# Patient Record
Sex: MALE | Race: WHITE | NOT HISPANIC OR LATINO | RURAL
[De-identification: names, ages, dates, MRNs, and addresses within clinical notes are randomized per-mention and may not be internally consistent; named-entity substitution may affect disease eponyms.]

---

## 2022-12-31 ENCOUNTER — HOSPITAL ENCOUNTER (OUTPATIENT)
Facility: HOSPITAL | Age: 72
LOS: 1 days | Discharge: HOME | DRG: 682 | End: 2023-01-08
Attending: EMERGENCY MEDICINE | Admitting: FAMILY MEDICINE
Payer: COMMERCIAL

## 2022-12-31 DIAGNOSIS — E78.5 DYSLIPIDEMIA: ICD-10-CM

## 2022-12-31 DIAGNOSIS — R07.9 CHEST PAIN: ICD-10-CM

## 2022-12-31 DIAGNOSIS — E11.69 TYPE 2 DIABETES MELLITUS WITH OTHER SPECIFIED COMPLICATION, WITH LONG-TERM CURRENT USE OF INSULIN: ICD-10-CM

## 2022-12-31 DIAGNOSIS — I10 HYPERTENSION, UNSPECIFIED TYPE: ICD-10-CM

## 2022-12-31 DIAGNOSIS — R06.00 DYSPNEA: ICD-10-CM

## 2022-12-31 DIAGNOSIS — R06.00 DYSPNEA, UNSPECIFIED TYPE: Primary | ICD-10-CM

## 2022-12-31 DIAGNOSIS — N18.6 ESRD (END STAGE RENAL DISEASE): ICD-10-CM

## 2022-12-31 DIAGNOSIS — D64.9 ANEMIA, UNSPECIFIED TYPE: ICD-10-CM

## 2022-12-31 DIAGNOSIS — Z79.4 TYPE 2 DIABETES MELLITUS WITH OTHER SPECIFIED COMPLICATION, WITH LONG-TERM CURRENT USE OF INSULIN: ICD-10-CM

## 2022-12-31 LAB
ALBUMIN SERPL BCP-MCNC: 3.3 G/DL (ref 3.5–5)
ALBUMIN/GLOB SERPL: 0.9 {RATIO}
ALP SERPL-CCNC: 173 U/L (ref 45–115)
ALT SERPL W P-5'-P-CCNC: 41 U/L (ref 16–61)
ANION GAP SERPL CALCULATED.3IONS-SCNC: 17 MMOL/L (ref 7–16)
APTT PPP: 27.8 SECONDS (ref 25.2–37.3)
AST SERPL W P-5'-P-CCNC: 24 U/L (ref 15–37)
BACTERIA #/AREA URNS HPF: ABNORMAL /HPF
BASOPHILS # BLD AUTO: 0.07 K/UL (ref 0–0.2)
BASOPHILS NFR BLD AUTO: 0.7 % (ref 0–1)
BILIRUB SERPL-MCNC: 0.3 MG/DL (ref ?–1.2)
BILIRUB UR QL STRIP: NEGATIVE
BUN SERPL-MCNC: 48 MG/DL (ref 7–18)
BUN/CREAT SERPL: 7 (ref 6–20)
CALCIUM SERPL-MCNC: 8.7 MG/DL (ref 8.5–10.1)
CHLORIDE SERPL-SCNC: 99 MMOL/L (ref 98–107)
CLARITY UR: CLEAR
CO2 SERPL-SCNC: 26 MMOL/L (ref 21–32)
COLOR UR: COLORLESS
CREAT SERPL-MCNC: 6.61 MG/DL (ref 0.7–1.3)
DIFFERENTIAL METHOD BLD: ABNORMAL
EGFR (NO RACE VARIABLE) (RUSH/TITUS): 8 ML/MIN/1.73M²
EOSINOPHIL # BLD AUTO: 0.48 K/UL (ref 0–0.5)
EOSINOPHIL NFR BLD AUTO: 4.7 % (ref 1–4)
ERYTHROCYTE [DISTWIDTH] IN BLOOD BY AUTOMATED COUNT: 13.2 % (ref 11.5–14.5)
GLOBULIN SER-MCNC: 3.6 G/DL (ref 2–4)
GLUCOSE SERPL-MCNC: 389 MG/DL (ref 74–106)
GLUCOSE UR STRIP-MCNC: >1000 MG/DL
HCT VFR BLD AUTO: 31.1 % (ref 40–54)
HGB BLD-MCNC: 10.3 G/DL (ref 13.5–18)
IMM GRANULOCYTES # BLD AUTO: 0.03 K/UL (ref 0–0.04)
IMM GRANULOCYTES NFR BLD: 0.3 % (ref 0–0.4)
INR BLD: 0.94
KETONES UR STRIP-SCNC: NEGATIVE MG/DL
LEUKOCYTE ESTERASE UR QL STRIP: NEGATIVE
LYMPHOCYTES # BLD AUTO: 2.52 K/UL (ref 1–4.8)
LYMPHOCYTES NFR BLD AUTO: 24.5 % (ref 27–41)
MAGNESIUM SERPL-MCNC: 3.4 MG/DL (ref 1.7–2.3)
MCH RBC QN AUTO: 27.9 PG (ref 27–31)
MCHC RBC AUTO-ENTMCNC: 33.1 G/DL (ref 32–36)
MCV RBC AUTO: 84.3 FL (ref 80–96)
MONOCYTES # BLD AUTO: 0.66 K/UL (ref 0–0.8)
MONOCYTES NFR BLD AUTO: 6.4 % (ref 2–6)
MPC BLD CALC-MCNC: 11.8 FL (ref 9.4–12.4)
NEUTROPHILS # BLD AUTO: 6.53 K/UL (ref 1.8–7.7)
NEUTROPHILS NFR BLD AUTO: 63.4 % (ref 53–65)
NITRITE UR QL STRIP: NEGATIVE
NRBC # BLD AUTO: 0 X10E3/UL
NRBC, AUTO (.00): 0 %
NT-PROBNP SERPL-MCNC: ABNORMAL PG/ML (ref 1–125)
PH UR STRIP: 7.5 PH UNITS
PLATELET # BLD AUTO: 267 K/UL (ref 150–400)
POTASSIUM SERPL-SCNC: 4.7 MMOL/L (ref 3.5–5.1)
PROT SERPL-MCNC: 6.9 G/DL (ref 6.4–8.2)
PROT UR QL STRIP: 300
PROTHROMBIN TIME: 12.2 SECONDS (ref 11.7–14.7)
RBC # BLD AUTO: 3.69 M/UL (ref 4.6–6.2)
RBC # UR STRIP: ABNORMAL /UL
SARS-COV-2 RDRP RESP QL NAA+PROBE: NEGATIVE
SODIUM SERPL-SCNC: 137 MMOL/L (ref 136–145)
SP GR UR STRIP: 1.01
SQUAMOUS #/AREA URNS LPF: ABNORMAL /LPF
TROPONIN I SERPL HS-MCNC: 251.6 PG/ML
TROPONIN I SERPL HS-MCNC: 279.5 PG/ML
UROBILINOGEN UR STRIP-ACNC: NORMAL MG/DL
WBC # BLD AUTO: 10.29 K/UL (ref 4.5–11)
WBC #/AREA URNS HPF: ABNORMAL /HPF
YEAST #/AREA URNS HPF: ABNORMAL /HPF

## 2022-12-31 PROCEDURE — 83735 ASSAY OF MAGNESIUM: CPT | Performed by: EMERGENCY MEDICINE

## 2022-12-31 PROCEDURE — 93010 EKG 12-LEAD: ICD-10-PCS | Mod: ,,, | Performed by: INTERNAL MEDICINE

## 2022-12-31 PROCEDURE — 81003 URINALYSIS AUTO W/O SCOPE: CPT | Performed by: EMERGENCY MEDICINE

## 2022-12-31 PROCEDURE — 80053 COMPREHEN METABOLIC PANEL: CPT | Performed by: EMERGENCY MEDICINE

## 2022-12-31 PROCEDURE — 85730 THROMBOPLASTIN TIME PARTIAL: CPT | Performed by: EMERGENCY MEDICINE

## 2022-12-31 PROCEDURE — 93005 ELECTROCARDIOGRAM TRACING: CPT

## 2022-12-31 PROCEDURE — 87635 SARS-COV-2 COVID-19 AMP PRB: CPT | Performed by: FAMILY MEDICINE

## 2022-12-31 PROCEDURE — 99223 1ST HOSP IP/OBS HIGH 75: CPT | Mod: ,,, | Performed by: FAMILY MEDICINE

## 2022-12-31 PROCEDURE — 93010 ELECTROCARDIOGRAM REPORT: CPT | Mod: ,,, | Performed by: INTERNAL MEDICINE

## 2022-12-31 PROCEDURE — 99285 EMERGENCY DEPT VISIT HI MDM: CPT | Mod: 25

## 2022-12-31 PROCEDURE — 99223 PR INITIAL HOSPITAL CARE,LEVL III: ICD-10-PCS | Mod: ,,, | Performed by: FAMILY MEDICINE

## 2022-12-31 PROCEDURE — 85025 COMPLETE CBC W/AUTO DIFF WBC: CPT | Performed by: EMERGENCY MEDICINE

## 2022-12-31 PROCEDURE — 85610 PROTHROMBIN TIME: CPT | Performed by: EMERGENCY MEDICINE

## 2022-12-31 PROCEDURE — 83880 ASSAY OF NATRIURETIC PEPTIDE: CPT | Performed by: EMERGENCY MEDICINE

## 2022-12-31 PROCEDURE — 84484 ASSAY OF TROPONIN QUANT: CPT | Performed by: FAMILY MEDICINE

## 2022-12-31 PROCEDURE — 63600175 PHARM REV CODE 636 W HCPCS: Performed by: FAMILY MEDICINE

## 2022-12-31 PROCEDURE — 99284 EMERGENCY DEPT VISIT MOD MDM: CPT | Mod: ,,, | Performed by: EMERGENCY MEDICINE

## 2022-12-31 PROCEDURE — 84484 ASSAY OF TROPONIN QUANT: CPT | Performed by: EMERGENCY MEDICINE

## 2022-12-31 PROCEDURE — 36415 COLL VENOUS BLD VENIPUNCTURE: CPT | Performed by: EMERGENCY MEDICINE

## 2022-12-31 PROCEDURE — 11000001 HC ACUTE MED/SURG PRIVATE ROOM

## 2022-12-31 PROCEDURE — 25000003 PHARM REV CODE 250: Performed by: FAMILY MEDICINE

## 2022-12-31 PROCEDURE — 99284 PR EMERGENCY DEPT VISIT,LEVEL IV: ICD-10-PCS | Mod: ,,, | Performed by: EMERGENCY MEDICINE

## 2022-12-31 RX ORDER — ATORVASTATIN CALCIUM 80 MG/1
80 TABLET, FILM COATED ORAL DAILY
Status: DISCONTINUED | OUTPATIENT
Start: 2023-01-01 | End: 2023-01-08 | Stop reason: HOSPADM

## 2022-12-31 RX ORDER — INSULIN ASPART 100 [IU]/ML
0-5 INJECTION, SOLUTION INTRAVENOUS; SUBCUTANEOUS
Status: DISCONTINUED | OUTPATIENT
Start: 2022-12-31 | End: 2023-01-08 | Stop reason: HOSPADM

## 2022-12-31 RX ORDER — NAPROXEN SODIUM 220 MG/1
81 TABLET, FILM COATED ORAL DAILY
Status: DISCONTINUED | OUTPATIENT
Start: 2023-01-01 | End: 2023-01-08 | Stop reason: HOSPADM

## 2022-12-31 RX ORDER — CLOPIDOGREL BISULFATE 75 MG/1
75 TABLET ORAL DAILY
Status: DISCONTINUED | OUTPATIENT
Start: 2023-01-01 | End: 2023-01-08 | Stop reason: HOSPADM

## 2022-12-31 RX ORDER — AMLODIPINE BESYLATE 10 MG/1
10 TABLET ORAL DAILY
Status: DISCONTINUED | OUTPATIENT
Start: 2023-01-01 | End: 2023-01-08 | Stop reason: HOSPADM

## 2022-12-31 RX ORDER — NALOXONE HCL 0.4 MG/ML
0.02 VIAL (ML) INJECTION
Status: DISCONTINUED | OUTPATIENT
Start: 2022-12-31 | End: 2023-01-08 | Stop reason: HOSPADM

## 2022-12-31 RX ORDER — HEPARIN SODIUM 5000 [USP'U]/ML
5000 INJECTION, SOLUTION INTRAVENOUS; SUBCUTANEOUS EVERY 8 HOURS
Status: DISCONTINUED | OUTPATIENT
Start: 2022-12-31 | End: 2023-01-08 | Stop reason: HOSPADM

## 2022-12-31 RX ORDER — CARVEDILOL 12.5 MG/1
12.5 TABLET ORAL 2 TIMES DAILY
Status: DISCONTINUED | OUTPATIENT
Start: 2022-12-31 | End: 2023-01-08

## 2022-12-31 RX ORDER — SODIUM CHLORIDE 0.9 % (FLUSH) 0.9 %
10 SYRINGE (ML) INJECTION EVERY 12 HOURS PRN
Status: DISCONTINUED | OUTPATIENT
Start: 2022-12-31 | End: 2023-01-08 | Stop reason: HOSPADM

## 2022-12-31 RX ORDER — HYDRALAZINE HYDROCHLORIDE 20 MG/ML
20 INJECTION INTRAMUSCULAR; INTRAVENOUS
Status: DISCONTINUED | OUTPATIENT
Start: 2022-12-31 | End: 2022-12-31

## 2022-12-31 RX ORDER — FUROSEMIDE 10 MG/ML
40 INJECTION INTRAMUSCULAR; INTRAVENOUS
Status: DISCONTINUED | OUTPATIENT
Start: 2022-12-31 | End: 2023-01-08

## 2022-12-31 RX ORDER — GLUCAGON 1 MG
1 KIT INJECTION
Status: DISCONTINUED | OUTPATIENT
Start: 2022-12-31 | End: 2023-01-08 | Stop reason: HOSPADM

## 2022-12-31 RX ADMIN — HEPARIN SODIUM 5000 UNITS: 5000 INJECTION, SOLUTION INTRAVENOUS; SUBCUTANEOUS at 10:12

## 2022-12-31 RX ADMIN — NITROGLYCERIN 0.5 INCH: 20 OINTMENT TOPICAL at 07:12

## 2022-12-31 RX ADMIN — SODIUM ZIRCONIUM CYCLOSILICATE 5 G: 5 POWDER, FOR SUSPENSION ORAL at 07:12

## 2022-12-31 RX ADMIN — CARVEDILOL 12.5 MG: 12.5 TABLET, FILM COATED ORAL at 10:12

## 2022-12-31 RX ADMIN — FUROSEMIDE 40 MG: 10 INJECTION, SOLUTION INTRAMUSCULAR; INTRAVENOUS at 07:12

## 2022-12-31 NOTE — SUBJECTIVE & OBJECTIVE
Past Medical History:   Diagnosis Date    Diabetes mellitus     Hypertension     Renal failure     Stroke        History reviewed. No pertinent surgical history.    Review of patient's allergies indicates:  No Known Allergies    No current facility-administered medications on file prior to encounter.     No current outpatient medications on file prior to encounter.     Family History    None       Tobacco Use    Smoking status: Never    Smokeless tobacco: Never   Substance and Sexual Activity    Alcohol use: Yes     Alcohol/week: 1.0 standard drink     Types: 1 Cans of beer per week    Drug use: Never    Sexual activity: Not on file     Review of Systems   Constitutional:  Negative for activity change, chills and fever.   HENT:  Negative for sinus pressure and sinus pain.    Respiratory:  Positive for shortness of breath. Negative for cough and wheezing.    Cardiovascular:  Negative for chest pain and palpitations.   Gastrointestinal:  Negative for abdominal pain, nausea and vomiting.   Genitourinary:  Negative for dysuria and hematuria.   Musculoskeletal:  Negative for arthralgias and joint swelling.   Skin:  Negative for color change and rash.   Objective:     Vital Signs (Most Recent):  Temp: 97.4 °F (36.3 °C) (12/31/22 1300)  Pulse: 68 (12/31/22 1544)  Resp: 17 (12/31/22 1544)  BP: (!) 176/75 (12/31/22 1544)  SpO2: 99 % (12/31/22 1544)   Vital Signs (24h Range):  Temp:  [97.4 °F (36.3 °C)] 97.4 °F (36.3 °C)  Pulse:  [59-69] 68  Resp:  [10-17] 17  SpO2:  [96 %-99 %] 99 %  BP: (169-213)/(67-85) 176/75     Weight: 90.7 kg (200 lb)  Body mass index is 27.12 kg/m².    Physical Exam  Vitals reviewed.   Constitutional:       General: He is not in acute distress.     Appearance: He is not toxic-appearing.   HENT:      Head: Normocephalic and atraumatic.      Right Ear: External ear normal.      Left Ear: External ear normal.      Nose:      Comments: Post op changes nose. Hx of cancer surgery and reconstruction.    Cardiovascular:      Rate and Rhythm: Normal rate and regular rhythm.      Heart sounds: Normal heart sounds.   Pulmonary:      Effort: Pulmonary effort is normal. No respiratory distress.      Breath sounds: Normal breath sounds. No wheezing.   Abdominal:      General: Abdomen is flat. Bowel sounds are normal. There is no distension.      Palpations: Abdomen is soft.      Tenderness: There is no abdominal tenderness.   Musculoskeletal:      Right lower leg: Edema present.      Left lower leg: Edema present.   Skin:     General: Skin is warm and dry.      Coloration: Skin is not jaundiced.      Findings: No bruising.   Neurological:      General: No focal deficit present.      Mental Status: He is alert and oriented to person, place, and time.      Comments: Subtle, mild confusion.           Significant Labs: All pertinent labs within the past 24 hours have been reviewed.  Recent Lab Results         12/31/22  1502   12/31/22  1313        Albumin/Globulin Ratio   0.9       Albumin   3.3       Alkaline Phosphatase   173       ALT   41       Anion Gap   17       Appearance, UA Clear         aPTT   27.8       AST   24       Bacteria, UA Few         Baso #   0.07       Basophil %   0.7       Bilirubin (UA) Negative         BILIRUBIN TOTAL   0.3       BUN   48       BUN/CREAT RATIO   7       Calcium   8.7       Chloride   99       CO2   26       Color, UA Colorless         Creatinine   6.61       Differential Type   Auto       eGFR   8       Eos #   0.48       Eosinophil %   4.7       Globulin, Total   3.6       Glucose   389       Glucose, UA >1000         Hematocrit   31.1       Hemoglobin   10.3       Immature Grans (Abs)   0.03       Immature Granulocytes   0.3       INR   0.94       Ketones, UA Negative         Leukocytes, UA Negative         Lymph #   2.52       Lymph %   24.5       Magnesium   3.4       MCH   27.9       MCHC   33.1       MCV   84.3       Mono #   0.66       Mono %   6.4       MPV   11.8        Neutrophils, Abs   6.53       Neutrophils Relative   63.4       NITRITE UA Negative         nRBC   0.0       NT-proBNP   10,870       NUCLEATED RBC ABSOLUTE   0.00       Occult Blood UA Trace         pH, UA 7.5         Platelets   267       Potassium   4.7       PROTEIN TOTAL   6.9       Protein,          Protime   12.2       RBC   3.69       RDW   13.2       Sodium   137       Specific Gravity, UA 1.009         Squam Epithel, UA Few         Troponin I High Sensitivity   279.5       UROBILINOGEN UA Normal         WBC, UA 0-5         WBC   10.29       Yeast, UA Few                 Significant Imaging: I have reviewed all pertinent imaging results/findings within the past 24 hours. CXR - no pulmonary edema

## 2022-12-31 NOTE — HPI
71 y/o WM with ESRD on HD for about 1 year.  Nephrologist Sven Bernal.  Has missed last 2 HD sessions as he has been dismissed as they found a firearm in his pocket.  Patient states he forgot to leave it in his car.  Regardless patient with some mild SOB today. Currently feels fine and would like to go home but has no where to get HD.   No CP but troponin elevated. We are asked to admit to arrange HD.

## 2022-12-31 NOTE — ED PROVIDER NOTES
Encounter Date: 12/31/2022    SCRIBE #1 NOTE: I, Nini Jacob, am scribing for, and in the presence of,  Elias Bernal MD. I have scribed the entire note.     History     Chief Complaint   Patient presents with    Shortness of Breath     72 y.o. male presents to the ED with complaints of shortness of breath. Patient has been on dialysis for about a year. Patient stated he was supposed to dialyze Thursday and today. Patient was kicked out of facility because he brought a gun in with him.     The history is provided by the patient and a relative. No  was used.   Review of patient's allergies indicates:  No Known Allergies  Past Medical History:   Diagnosis Date    Diabetes mellitus     Hypertension     Renal failure     Stroke      History reviewed. No pertinent surgical history.  History reviewed. No pertinent family history.  Social History     Tobacco Use    Smoking status: Never    Smokeless tobacco: Never   Substance Use Topics    Alcohol use: Yes     Alcohol/week: 1.0 standard drink     Types: 1 Cans of beer per week    Drug use: Never     Review of Systems   Constitutional: Negative.    HENT: Negative.     Eyes: Negative.    Respiratory:  Positive for shortness of breath.    Cardiovascular: Negative.    Gastrointestinal: Negative.    Endocrine: Negative.    Genitourinary: Negative.    Musculoskeletal: Negative.    Skin: Negative.    Allergic/Immunologic: Negative.    Neurological: Negative.    Hematological: Negative.    Psychiatric/Behavioral: Negative.     All other systems reviewed and are negative.    Physical Exam     Initial Vitals [12/31/22 1300]   BP Pulse Resp Temp SpO2   (!) 213/85 69 13 97.4 °F (36.3 °C) 98 %      MAP       --         Physical Exam    Nursing note and vitals reviewed.  Constitutional: He appears well-developed and well-nourished.   Neck:   Normal range of motion.  Cardiovascular:  Normal rate, regular rhythm and normal heart sounds.            Pulmonary/Chest: Breath sounds normal.   Abdominal: Abdomen is soft.   Musculoskeletal:         General: Normal range of motion.      Cervical back: Normal range of motion.      Right lower leg: Pitting Edema present.      Left lower leg: Pitting Edema present.     Neurological: He is alert and oriented to person, place, and time.   Skin: Skin is warm.       ED Course   Procedures  Labs Reviewed   COMPREHENSIVE METABOLIC PANEL - Abnormal; Notable for the following components:       Result Value    Anion Gap 17 (*)     Glucose 389 (*)     BUN 48 (*)     Creatinine 6.61 (*)     Albumin 3.3 (*)     Alk Phos 173 (*)     eGFR 8 (*)     All other components within normal limits   NT-PRO NATRIURETIC PEPTIDE - Abnormal; Notable for the following components:    ProBNP 10,870 (*)     All other components within normal limits   MAGNESIUM - Abnormal; Notable for the following components:    Magnesium 3.4 (*)     All other components within normal limits   TROPONIN I - Abnormal; Notable for the following components:    Troponin I High Sensitivity 279.5 (*)     All other components within normal limits   URINALYSIS, REFLEX TO URINE CULTURE - Abnormal; Notable for the following components:    Protein,  (*)     Blood, UA Trace (*)     All other components within normal limits   CBC WITH DIFFERENTIAL - Abnormal; Notable for the following components:    RBC 3.69 (*)     Hemoglobin 10.3 (*)     Hematocrit 31.1 (*)     Lymphocytes % 24.5 (*)     Monocytes % 6.4 (*)     Eosinophils % 4.7 (*)     All other components within normal limits   URINALYSIS, MICROSCOPIC - Abnormal; Notable for the following components:    Bacteria, UA Few (*)     Yeast, UA Few (*)     Squamous Epithelial Cells, UA Few (*)     All other components within normal limits   APTT - Normal   PROTIME-INR - Normal   CBC W/ AUTO DIFFERENTIAL    Narrative:     The following orders were created for panel order CBC auto differential.  Procedure                                Abnormality         Status                     ---------                               -----------         ------                     CBC with Differential[[id number]]        Abnormal            Final result                 Please view results for these tests on the individual orders.          Imaging Results              X-Ray Chest AP Portable (Final result)  Result time 12/31/22 13:13:41      Final result by Pancho Gonzalez MD (12/31/22 13:13:41)                   Impression:      No acute findings.      Electronically signed by: Pancho Gonzalez  Date:    12/31/2022  Time:    13:13               Narrative:    EXAMINATION:  XR CHEST AP PORTABLE    CLINICAL HISTORY:  Dyspnea, unspecified    TECHNIQUE:  Single frontal view of the chest was performed.    COMPARISON:  None    FINDINGS:  Cardiac silhouette mildly enlarged.  Lungs clear.  No pneumothorax.  No large pleural effusion.                                       Medications - No data to display             Attending Attestation:           Physician Attestation for Scribe:  Physician Attestation Statement for Scribe #1: I, Elias Bernal MD, reviewed documentation, as scribed by Nini Jacob in my presence, and it is both accurate and complete.                        Clinical Impression:   Final diagnoses:  [R06.00] Dyspnea  [R06.00] Dyspnea, unspecified type (Primary)  [N18.6] ESRD (end stage renal disease)        ED Disposition Condition    Observation Stable                Elias Bernal MD  12/31/22 6980

## 2022-12-31 NOTE — ED TRIAGE NOTES
Pt presents to ED with SOB; states he has not had dialysis since Tuesday. Missed Thursday HDY and today; HDY facility will not let him come back since he forgot to take a pistol out of his pocket.

## 2022-12-31 NOTE — H&P
[facility] - Emergency Department  Hospital Medicine  History & Physical    Patient Name: Leanna Harrison  MRN: [id number]  Patient Class: IP- Inpatient  Admission Date: 12/31/2022  Attending Physician: Eleazar Singh Jr., MD   Primary Care Provider: Primary Doctor No         Patient information was obtained from patient, relative(s) and ER records.     Subjective:     Principal Problem:ESRD (end stage renal disease)    Chief Complaint:   Chief Complaint   Patient presents with    Shortness of Breath        HPI: 73 y/o WM with ESRD on HD for about 1 year.  Nephrologist Sven Bernal.  Has missed last 2 HD sessions as he has been dismissed as they found a firearm in his pocket.  Patient states he forgot to leave it in his car.  Regardless patient with some mild SOB today. Currently feels fine and would like to go home but has no where to get HD.   No CP but troponin elevated. We are asked to admit to arrange HD.       Past Medical History:   Diagnosis Date    Diabetes mellitus     Hypertension     Renal failure     Stroke        History reviewed. No pertinent surgical history.    Review of patient's allergies indicates:  No Known Allergies    No current facility-administered medications on file prior to encounter.     No current outpatient medications on file prior to encounter.     Family History    None       Tobacco Use    Smoking status: Never    Smokeless tobacco: Never   Substance and Sexual Activity    Alcohol use: Yes     Alcohol/week: 1.0 standard drink     Types: 1 Cans of beer per week    Drug use: Never    Sexual activity: Not on file     Review of Systems   Constitutional:  Negative for activity change, chills and fever.   HENT:  Negative for sinus pressure and sinus pain.    Respiratory:  Positive for shortness of breath. Negative for cough and wheezing.    Cardiovascular:  Negative for chest pain and palpitations.   Gastrointestinal:  Negative for abdominal pain, nausea and vomiting.    Genitourinary:  Negative for dysuria and hematuria.   Musculoskeletal:  Negative for arthralgias and joint swelling.   Skin:  Negative for color change and rash.   Objective:     Vital Signs (Most Recent):  Temp: 97.4 °F (36.3 °C) (12/31/22 1300)  Pulse: 68 (12/31/22 1544)  Resp: 17 (12/31/22 1544)  BP: (!) 176/75 (12/31/22 1544)  SpO2: 99 % (12/31/22 1544)   Vital Signs (24h Range):  Temp:  [97.4 °F (36.3 °C)] 97.4 °F (36.3 °C)  Pulse:  [59-69] 68  Resp:  [10-17] 17  SpO2:  [96 %-99 %] 99 %  BP: (169-213)/(67-85) 176/75     Weight: 90.7 kg (200 lb)  Body mass index is 27.12 kg/m².    Physical Exam  Vitals reviewed.   Constitutional:       General: He is not in acute distress.     Appearance: He is not toxic-appearing.   HENT:      Head: Normocephalic and atraumatic.      Right Ear: External ear normal.      Left Ear: External ear normal.      Nose:      Comments: Post op changes nose. Hx of cancer surgery and reconstruction.   Cardiovascular:      Rate and Rhythm: Normal rate and regular rhythm.      Heart sounds: Normal heart sounds.   Pulmonary:      Effort: Pulmonary effort is normal. No respiratory distress.      Breath sounds: Normal breath sounds. No wheezing.   Abdominal:      General: Abdomen is flat. Bowel sounds are normal. There is no distension.      Palpations: Abdomen is soft.      Tenderness: There is no abdominal tenderness.   Musculoskeletal:      Right lower leg: Edema present.      Left lower leg: Edema present.   Skin:     General: Skin is warm and dry.      Coloration: Skin is not jaundiced.      Findings: No bruising.   Neurological:      General: No focal deficit present.      Mental Status: He is alert and oriented to person, place, and time.      Comments: Subtle, mild confusion.           Significant Labs: All pertinent labs within the past 24 hours have been reviewed.  Recent Lab Results         12/31/22  1502   12/31/22  1313        Albumin/Globulin Ratio   0.9       Albumin   3.3        Alkaline Phosphatase   173       ALT   41       Anion Gap   17       Appearance, UA Clear         aPTT   27.8       AST   24       Bacteria, UA Few         Baso #   0.07       Basophil %   0.7       Bilirubin (UA) Negative         BILIRUBIN TOTAL   0.3       BUN   48       BUN/CREAT RATIO   7       Calcium   8.7       Chloride   99       CO2   26       Color, UA Colorless         Creatinine   6.61       Differential Type   Auto       eGFR   8       Eos #   0.48       Eosinophil %   4.7       Globulin, Total   3.6       Glucose   389       Glucose, UA >1000         Hematocrit   31.1       Hemoglobin   10.3       Immature Grans (Abs)   0.03       Immature Granulocytes   0.3       INR   0.94       Ketones, UA Negative         Leukocytes, UA Negative         Lymph #   2.52       Lymph %   24.5       Magnesium   3.4       MCH   27.9       MCHC   33.1       MCV   84.3       Mono #   0.66       Mono %   6.4       MPV   11.8       Neutrophils, Abs   6.53       Neutrophils Relative   63.4       NITRITE UA Negative         nRBC   0.0       NT-proBNP   10,870       NUCLEATED RBC ABSOLUTE   0.00       Occult Blood UA Trace         pH, UA 7.5         Platelets   267       Potassium   4.7       PROTEIN TOTAL   6.9       Protein,          Protime   12.2       RBC   3.69       RDW   13.2       Sodium   137       Specific Gravity, UA 1.009         Squam Epithel, UA Few         Troponin I High Sensitivity   279.5       UROBILINOGEN UA Normal         WBC, UA 0-5         WBC   10.29       Yeast, UA Few                 Significant Imaging: I have reviewed all pertinent imaging results/findings within the past 24 hours. CXR - no pulmonary edema    Assessment/Plan:     * ESRD (end stage renal disease)    Currently without an outpatient chair. Has missed 2 sessions, Bun/cr only 48/6.6.  Will have to work on this after the holiday.  No indication for urgent HD tonight. Will use nitropaste to relieve any pulmonary congestion.  Give  a dose of lokelma,  K 4.7. Still producing some urine will give lasix 40 bid and place on fluid restriction. Consult nephrology tomorrow.     Dyspnea    Likely related to fluid accumulation. See plan for ESRD.  Troponin elevated x 1. Will recheck. No Chest pain.     VTE Risk Mitigation (From admission, onward)         Ordered     heparin (porcine) injection 5,000 Units  Every 8 hours         12/31/22 1737     IP VTE HIGH RISK PATIENT  Once         12/31/22 1737     Place sequential compression device  Until discontinued         12/31/22 1737                   Eleazar Singh Jr, MD  Department of Hospital Medicine   [facility] - Emergency Department

## 2022-12-31 NOTE — ASSESSMENT & PLAN NOTE
Likely related to fluid accumulation. See plan for ESRD.  Troponin elevated x 1. Will recheck. No Chest pain.

## 2022-12-31 NOTE — ASSESSMENT & PLAN NOTE
Currently without an outpatient chair. Has missed 2 sessions, Bun/cr only 48/6.6.  Will have to work on this after the holiday.  No indication for urgent HD tonight. Will use nitropaste to relieve any pulmonary congestion.  Give a dose of lokelma,  K 4.7. Still producing some urine will give lasix 40 bid and place on fluid restriction. Consult nephrology tomorrow.

## 2023-01-01 PROBLEM — I10 HTN (HYPERTENSION): Status: ACTIVE | Noted: 2023-01-01

## 2023-01-01 PROBLEM — D64.9 ANEMIA: Status: ACTIVE | Noted: 2023-01-01

## 2023-01-01 PROBLEM — E78.5 DYSLIPIDEMIA: Status: ACTIVE | Noted: 2023-01-01

## 2023-01-01 PROBLEM — E11.9 DIABETES MELLITUS: Status: ACTIVE | Noted: 2023-01-01

## 2023-01-01 LAB
ALBUMIN SERPL BCP-MCNC: 2.8 G/DL (ref 3.5–5)
ANION GAP SERPL CALCULATED.3IONS-SCNC: 15 MMOL/L (ref 7–16)
BASOPHILS # BLD AUTO: 0.07 K/UL (ref 0–0.2)
BASOPHILS NFR BLD AUTO: 0.7 % (ref 0–1)
BUN SERPL-MCNC: 51 MG/DL (ref 7–18)
BUN/CREAT SERPL: 7 (ref 6–20)
CALCIUM SERPL-MCNC: 8.5 MG/DL (ref 8.5–10.1)
CHLORIDE SERPL-SCNC: 105 MMOL/L (ref 98–107)
CO2 SERPL-SCNC: 26 MMOL/L (ref 21–32)
CREAT SERPL-MCNC: 6.88 MG/DL (ref 0.7–1.3)
DIFFERENTIAL METHOD BLD: ABNORMAL
EGFR (NO RACE VARIABLE) (RUSH/TITUS): 8 ML/MIN/1.73M²
EOSINOPHIL # BLD AUTO: 0.44 K/UL (ref 0–0.5)
EOSINOPHIL NFR BLD AUTO: 4.6 % (ref 1–4)
ERYTHROCYTE [DISTWIDTH] IN BLOOD BY AUTOMATED COUNT: 13.2 % (ref 11.5–14.5)
GLUCOSE SERPL-MCNC: 187 MG/DL (ref 70–105)
GLUCOSE SERPL-MCNC: 217 MG/DL (ref 70–105)
GLUCOSE SERPL-MCNC: 223 MG/DL (ref 74–106)
GLUCOSE SERPL-MCNC: 231 MG/DL (ref 70–105)
GLUCOSE SERPL-MCNC: 277 MG/DL (ref 70–105)
GLUCOSE SERPL-MCNC: 368 MG/DL (ref 70–105)
HCT VFR BLD AUTO: 28.7 % (ref 40–54)
HGB BLD-MCNC: 9.3 G/DL (ref 13.5–18)
IMM GRANULOCYTES # BLD AUTO: 0.02 K/UL (ref 0–0.04)
IMM GRANULOCYTES NFR BLD: 0.2 % (ref 0–0.4)
LYMPHOCYTES # BLD AUTO: 2.11 K/UL (ref 1–4.8)
LYMPHOCYTES NFR BLD AUTO: 21.9 % (ref 27–41)
MCH RBC QN AUTO: 27.8 PG (ref 27–31)
MCHC RBC AUTO-ENTMCNC: 32.4 G/DL (ref 32–36)
MCV RBC AUTO: 85.7 FL (ref 80–96)
MONOCYTES # BLD AUTO: 0.7 K/UL (ref 0–0.8)
MONOCYTES NFR BLD AUTO: 7.3 % (ref 2–6)
MPC BLD CALC-MCNC: 11.5 FL (ref 9.4–12.4)
NEUTROPHILS # BLD AUTO: 6.3 K/UL (ref 1.8–7.7)
NEUTROPHILS NFR BLD AUTO: 65.3 % (ref 53–65)
NRBC # BLD AUTO: 0 X10E3/UL
NRBC, AUTO (.00): 0 %
PHOSPHATE SERPL-MCNC: 5.2 MG/DL (ref 2.5–4.5)
PLATELET # BLD AUTO: 247 K/UL (ref 150–400)
POTASSIUM SERPL-SCNC: 4.2 MMOL/L (ref 3.5–5.1)
RBC # BLD AUTO: 3.35 M/UL (ref 4.6–6.2)
SODIUM SERPL-SCNC: 142 MMOL/L (ref 136–145)
WBC # BLD AUTO: 9.64 K/UL (ref 4.5–11)

## 2023-01-01 PROCEDURE — 80069 RENAL FUNCTION PANEL: CPT | Performed by: FAMILY MEDICINE

## 2023-01-01 PROCEDURE — 96372 THER/PROPH/DIAG INJ SC/IM: CPT | Mod: 59 | Performed by: FAMILY MEDICINE

## 2023-01-01 PROCEDURE — 25000003 PHARM REV CODE 250: Performed by: FAMILY MEDICINE

## 2023-01-01 PROCEDURE — 63600175 PHARM REV CODE 636 W HCPCS: Performed by: FAMILY MEDICINE

## 2023-01-01 PROCEDURE — 63600175 PHARM REV CODE 636 W HCPCS: Performed by: INTERNAL MEDICINE

## 2023-01-01 PROCEDURE — 11000001 HC ACUTE MED/SURG PRIVATE ROOM

## 2023-01-01 PROCEDURE — 99232 SBSQ HOSP IP/OBS MODERATE 35: CPT | Mod: ,,, | Performed by: FAMILY MEDICINE

## 2023-01-01 PROCEDURE — 36415 COLL VENOUS BLD VENIPUNCTURE: CPT | Performed by: FAMILY MEDICINE

## 2023-01-01 PROCEDURE — 99232 PR SUBSEQUENT HOSPITAL CARE,LEVL II: ICD-10-PCS | Mod: ,,, | Performed by: FAMILY MEDICINE

## 2023-01-01 PROCEDURE — 85025 COMPLETE CBC W/AUTO DIFF WBC: CPT | Performed by: FAMILY MEDICINE

## 2023-01-01 PROCEDURE — 96374 THER/PROPH/DIAG INJ IV PUSH: CPT

## 2023-01-01 PROCEDURE — 82962 GLUCOSE BLOOD TEST: CPT

## 2023-01-01 PROCEDURE — G0378 HOSPITAL OBSERVATION PER HR: HCPCS

## 2023-01-01 RX ADMIN — NITROGLYCERIN 0.5 INCH: 20 OINTMENT TOPICAL at 06:01

## 2023-01-01 RX ADMIN — CARVEDILOL 12.5 MG: 12.5 TABLET, FILM COATED ORAL at 08:01

## 2023-01-01 RX ADMIN — ASPIRIN 81 MG CHEWABLE TABLET 81 MG: 81 TABLET CHEWABLE at 08:01

## 2023-01-01 RX ADMIN — FUROSEMIDE 40 MG: 10 INJECTION, SOLUTION INTRAMUSCULAR; INTRAVENOUS at 06:01

## 2023-01-01 RX ADMIN — CLOPIDOGREL BISULFATE 75 MG: 75 TABLET, FILM COATED ORAL at 08:01

## 2023-01-01 RX ADMIN — NITROGLYCERIN 0.5 INCH: 20 OINTMENT TOPICAL at 03:01

## 2023-01-01 RX ADMIN — INSULIN ASPART 3 UNITS: 100 INJECTION, SOLUTION INTRAVENOUS; SUBCUTANEOUS at 05:01

## 2023-01-01 RX ADMIN — AMLODIPINE BESYLATE 10 MG: 10 TABLET ORAL at 08:01

## 2023-01-01 RX ADMIN — NITROGLYCERIN 0.5 INCH: 20 OINTMENT TOPICAL at 05:01

## 2023-01-01 RX ADMIN — HEPARIN SODIUM 5000 UNITS: 5000 INJECTION, SOLUTION INTRAVENOUS; SUBCUTANEOUS at 03:01

## 2023-01-01 RX ADMIN — NITROGLYCERIN 0.5 INCH: 20 OINTMENT TOPICAL at 12:01

## 2023-01-01 RX ADMIN — ATORVASTATIN CALCIUM 80 MG: 80 TABLET, FILM COATED ORAL at 08:01

## 2023-01-01 RX ADMIN — EPOETIN ALFA-EPBX 10000 UNITS: 10000 INJECTION, SOLUTION INTRAVENOUS; SUBCUTANEOUS at 03:01

## 2023-01-01 RX ADMIN — HEPARIN SODIUM 5000 UNITS: 5000 INJECTION, SOLUTION INTRAVENOUS; SUBCUTANEOUS at 06:01

## 2023-01-01 NOTE — CONSULTS
[facility] - Emergency Department  Nephrology  Consult Note    Patient Name: Leanna Harrison  MRN: [id number]  Admission Date: 12/31/2022  Hospital Length of Stay: 1 days  Attending Provider: Eleazar Singh Jr., MD   Primary Care Physician: Primary Doctor No  Principal Problem:ESRD (end stage renal disease)    Consults  Subjective:     HPI: Chief complaint is ESRD    History of present illness Mr. Almonte is a 72-year-old white gentleman with end-stage renal disease who had been dialyzing on a regular basis in [facility].  Apparently the patient last dialyzed this past Tuesday.  The history is taken from the patient the chart and medical personnel.  The patient apparently was fired from his regular dialysis unit for bring a gun on the premises.  The patient came to the emergency room at the urging of his family.  The patient was visiting his younger brother in the hospital here when he became short of breath.  The patient thinks his shortness of breath may have been precipitated by some indigestion after eating a cinnamon bark on yesterday morning while in the hospital.  He was given couple of doses of Lasix here in the hospital and his breathing is improved greatly.  The patient has over a L of urine in his urinal.  The patient is sitting upright he denies any shortness of breath at this time.  He states his shortness of breath was severe.  It was constant in nature until getting treatment in the emergency room.  The patient states he had a cough about a week or so ago but that improved on its own.  He complains of having some associated chest pain with his shortness of breath.      Past Medical History:   Diagnosis Date    Diabetes mellitus     Hypertension     Renal failure     Stroke        History reviewed. No pertinent surgical history.    Review of patient's allergies indicates:  No Known Allergies  Current Facility-Administered Medications   Medication Frequency    amLODIPine tablet 10 mg  Daily    aspirin chewable tablet 81 mg Daily    atorvastatin tablet 80 mg Daily    carvediloL tablet 12.5 mg BID    clopidogreL tablet 75 mg Daily    dextrose 10% bolus 125 mL 125 mL PRN    dextrose 10% bolus 250 mL 250 mL PRN    furosemide injection 40 mg Q12H    glucagon (human recombinant) injection 1 mg PRN    heparin (porcine) injection 5,000 Units Q8H    insulin aspart U-100 injection 0-5 Units QID (AC + HS) PRN    naloxone 0.4 mg/mL injection 0.02 mg PRN    nitroGLYCERIN 2% TD oint ointment 0.5 inch Q6H    sodium chloride 0.9% flush 10 mL Q12H PRN     No current outpatient medications on file.     Family History    None       Tobacco Use    Smoking status: Never    Smokeless tobacco: Never   Substance and Sexual Activity    Alcohol use: Yes     Alcohol/week: 1.0 standard drink     Types: 1 Cans of beer per week    Drug use: Never    Sexual activity: Not on file     Review of Systems   Constitutional:  Negative for fever.   HENT:  Negative for sore throat.    Respiratory:  Positive for shortness of breath.    Cardiovascular:  Positive for chest pain.   Gastrointestinal:  Negative for vomiting.   Genitourinary:  Negative for dysuria.   Musculoskeletal:  Positive for arthralgias.   Skin:  Positive for rash.   Neurological:  Negative for seizures.   Hematological:  Negative for adenopathy.   Objective:     Vital Signs (Most Recent):  Temp: 97.4 °F (36.3 °C) (12/31/22 1300)  Pulse: 61 (01/01/23 0650)  Resp: 18 (01/01/23 0650)  BP: (!) 179/73 (01/01/23 0650)  SpO2: 95 % (01/01/23 0650)   Vital Signs (24h Range):  Temp:  [97.4 °F (36.3 °C)] 97.4 °F (36.3 °C)  Pulse:  [59-69] 61  Resp:  [10-18] 18  SpO2:  [94 %-99 %] 95 %  BP: (169-213)/(67-85) 179/73     Weight: 90.7 kg (200 lb) (12/31/22 1300)  Body mass index is 27.12 kg/m².  Body surface area is 2.15 meters squared.    No intake/output data recorded.    Physical Exam  Vitals reviewed.   Constitutional:       General: He is not in acute  distress.     Appearance: He is not toxic-appearing.   HENT:      Head: Normocephalic and atraumatic.      Nose: Nose normal.      Mouth/Throat:      Mouth: Mucous membranes are moist.      Pharynx: Oropharynx is clear.   Eyes:      General: No scleral icterus.     Conjunctiva/sclera: Conjunctivae normal.      Pupils: Pupils are equal, round, and reactive to light.   Cardiovascular:      Rate and Rhythm: Normal rate and regular rhythm.   Pulmonary:      Effort: No respiratory distress.      Breath sounds: Normal breath sounds.   Abdominal:      General: Bowel sounds are normal.      Palpations: Abdomen is soft. There is no mass.   Musculoskeletal:         General: Swelling present. No tenderness.      Cervical back: Normal range of motion and neck supple.      Comments: Patient has some mild pitting edema in his left lower extremity   Lymphadenopathy:      Cervical: No cervical adenopathy.   Skin:     General: Skin is warm and dry.      Findings: No erythema.   Neurological:      General: No focal deficit present.      Mental Status: Mental status is at baseline.   Psychiatric:         Mood and Affect: Mood normal.         Behavior: Behavior normal.       Significant Labs:  All labs within the past 24 hours have been reviewed.    Significant Imaging:      Assessment/Plan:     * ESRD (end stage renal disease)  Patient dialyzes on a regular basis in [facility] however he is apparently been fired from this clinic.  We will ask  to help with placement of the patient into a dialysis center.  Will plan on dialyzing patient tomorrow as there is no acute indication for emergent dialysis at this time.    Diabetes mellitus  Continue present glycemic monitoring and treatment    Dyslipidemia  Continue Lipitor    HTN (hypertension)  Continue present antihypertensives    Anemia  Patient's hematocrit is 29%, will start him on EPO    Dyspnea  This has improved.  The patient's chest x-ray done yesterday showed  no acute findings, the patient attributes his shortness of breath 2 to some indigestion related to food he ate yesterday morning.        Thank you for your consult. I will follow-up with patient. Please contact us if you have any additional questions.    Eleazar Armenta MD  Nephrology  [facility] - Emergency Department

## 2023-01-01 NOTE — ED NOTES
Dr Armenta in room to talk with patient.  Patient will have dialysis on tomorrow while inpatient and Dr Armenta will get  involved and try to assist getting patients dialysis moved to [place] as an outpatient.  Patient verbalized understanding.

## 2023-01-01 NOTE — HPI
Chief complaint is ESRD    History of present illness Mr. Almonte is a 72-year-old white gentleman with end-stage renal disease who had been dialyzing on a regular basis in [facility].  Apparently the patient last dialyzed this past Tuesday.  The history is taken from the patient the chart and medical personnel.  The patient apparently was fired from his regular dialysis unit for bring a gun on the premises.  The patient came to the emergency room at the urging of his family.  The patient was visiting his younger brother in the hospital here when he became short of breath.  The patient thinks his shortness of breath may have been precipitated by some indigestion after eating a cinnamon bark on yesterday morning while in the hospital.  He was given couple of doses of Lasix here in the hospital and his breathing is improved greatly.  The patient has over a L of urine in his urinal.  The patient is sitting upright he denies any shortness of breath at this time.  He states his shortness of breath was severe.  It was constant in nature until getting treatment in the emergency room.  The patient states he had a cough about a week or so ago but that improved on its own.  He complains of having some associated chest pain with his shortness of breath.

## 2023-01-01 NOTE — ASSESSMENT & PLAN NOTE
This has improved.  The patient's chest x-ray done yesterday showed no acute findings, the patient attributes his shortness of breath 2 to some indigestion related to food he ate yesterday morning.

## 2023-01-01 NOTE — SUBJECTIVE & OBJECTIVE
Past Medical History:   Diagnosis Date    Diabetes mellitus     Hypertension     Renal failure     Stroke        History reviewed. No pertinent surgical history.    Review of patient's allergies indicates:  No Known Allergies  Current Facility-Administered Medications   Medication Frequency    amLODIPine tablet 10 mg Daily    aspirin chewable tablet 81 mg Daily    atorvastatin tablet 80 mg Daily    carvediloL tablet 12.5 mg BID    clopidogreL tablet 75 mg Daily    dextrose 10% bolus 125 mL 125 mL PRN    dextrose 10% bolus 250 mL 250 mL PRN    furosemide injection 40 mg Q12H    glucagon (human recombinant) injection 1 mg PRN    heparin (porcine) injection 5,000 Units Q8H    insulin aspart U-100 injection 0-5 Units QID (AC + HS) PRN    naloxone 0.4 mg/mL injection 0.02 mg PRN    nitroGLYCERIN 2% TD oint ointment 0.5 inch Q6H    sodium chloride 0.9% flush 10 mL Q12H PRN     No current outpatient medications on file.     Family History    None       Tobacco Use    Smoking status: Never    Smokeless tobacco: Never   Substance and Sexual Activity    Alcohol use: Yes     Alcohol/week: 1.0 standard drink     Types: 1 Cans of beer per week    Drug use: Never    Sexual activity: Not on file     Review of Systems   Constitutional:  Negative for fever.   HENT:  Negative for sore throat.    Respiratory:  Positive for shortness of breath.    Cardiovascular:  Positive for chest pain.   Gastrointestinal:  Negative for vomiting.   Genitourinary:  Negative for dysuria.   Musculoskeletal:  Positive for arthralgias.   Skin:  Positive for rash.   Neurological:  Negative for seizures.   Hematological:  Negative for adenopathy.   Objective:     Vital Signs (Most Recent):  Temp: 97.4 °F (36.3 °C) (12/31/22 1300)  Pulse: 61 (01/01/23 0650)  Resp: 18 (01/01/23 0650)  BP: (!) 179/73 (01/01/23 0650)  SpO2: 95 % (01/01/23 0650)   Vital Signs (24h Range):  Temp:  [97.4 °F (36.3 °C)] 97.4 °F (36.3 °C)  Pulse:  [59-69] 61  Resp:  [10-18] 18  SpO2:   [94 %-99 %] 95 %  BP: (169-213)/(67-85) 179/73     Weight: 90.7 kg (200 lb) (12/31/22 1300)  Body mass index is 27.12 kg/m².  Body surface area is 2.15 meters squared.    No intake/output data recorded.    Physical Exam  Vitals reviewed.   Constitutional:       General: He is not in acute distress.     Appearance: He is not toxic-appearing.   HENT:      Head: Normocephalic and atraumatic.      Nose: Nose normal.      Mouth/Throat:      Mouth: Mucous membranes are moist.      Pharynx: Oropharynx is clear.   Eyes:      General: No scleral icterus.     Conjunctiva/sclera: Conjunctivae normal.      Pupils: Pupils are equal, round, and reactive to light.   Cardiovascular:      Rate and Rhythm: Normal rate and regular rhythm.   Pulmonary:      Effort: No respiratory distress.      Breath sounds: Normal breath sounds.   Abdominal:      General: Bowel sounds are normal.      Palpations: Abdomen is soft. There is no mass.   Musculoskeletal:         General: Swelling present. No tenderness.      Cervical back: Normal range of motion and neck supple.      Comments: Patient has some mild pitting edema in his left lower extremity   Lymphadenopathy:      Cervical: No cervical adenopathy.   Skin:     General: Skin is warm and dry.      Findings: No erythema.   Neurological:      General: No focal deficit present.      Mental Status: Mental status is at baseline.   Psychiatric:         Mood and Affect: Mood normal.         Behavior: Behavior normal.       Significant Labs:  All labs within the past 24 hours have been reviewed.    Significant Imaging:

## 2023-01-01 NOTE — ASSESSMENT & PLAN NOTE
Patient dialyzes on a regular basis in [facility] however he is apparently been fired from this clinic.  We will ask  to help with placement of the patient into a dialysis center.  Will plan on dialyzing patient tomorrow as there is no acute indication for emergent dialysis at this time.

## 2023-01-02 LAB
ALBUMIN SERPL BCP-MCNC: 2.7 G/DL (ref 3.5–5)
ANION GAP SERPL CALCULATED.3IONS-SCNC: 14 MMOL/L (ref 7–16)
BASOPHILS # BLD AUTO: 0.05 K/UL (ref 0–0.2)
BASOPHILS NFR BLD AUTO: 0.5 % (ref 0–1)
BUN SERPL-MCNC: 51 MG/DL (ref 7–18)
BUN/CREAT SERPL: 7 (ref 6–20)
CALCIUM SERPL-MCNC: 8.5 MG/DL (ref 8.5–10.1)
CHLORIDE SERPL-SCNC: 106 MMOL/L (ref 98–107)
CO2 SERPL-SCNC: 25 MMOL/L (ref 21–32)
CREAT SERPL-MCNC: 7.35 MG/DL (ref 0.7–1.3)
DIFFERENTIAL METHOD BLD: ABNORMAL
EGFR (NO RACE VARIABLE) (RUSH/TITUS): 7 ML/MIN/1.73M²
EOSINOPHIL # BLD AUTO: 0.42 K/UL (ref 0–0.5)
EOSINOPHIL NFR BLD AUTO: 4.4 % (ref 1–4)
ERYTHROCYTE [DISTWIDTH] IN BLOOD BY AUTOMATED COUNT: 13.2 % (ref 11.5–14.5)
GLUCOSE SERPL-MCNC: 133 MG/DL (ref 70–105)
GLUCOSE SERPL-MCNC: 133 MG/DL (ref 74–106)
GLUCOSE SERPL-MCNC: 187 MG/DL (ref 70–105)
GLUCOSE SERPL-MCNC: 247 MG/DL (ref 70–105)
GLUCOSE SERPL-MCNC: 284 MG/DL (ref 70–105)
HCT VFR BLD AUTO: 27.8 % (ref 40–54)
HGB BLD-MCNC: 8.7 G/DL (ref 13.5–18)
IMM GRANULOCYTES # BLD AUTO: 0.02 K/UL (ref 0–0.04)
IMM GRANULOCYTES NFR BLD: 0.2 % (ref 0–0.4)
LYMPHOCYTES # BLD AUTO: 2.18 K/UL (ref 1–4.8)
LYMPHOCYTES NFR BLD AUTO: 23 % (ref 27–41)
MCH RBC QN AUTO: 27 PG (ref 27–31)
MCHC RBC AUTO-ENTMCNC: 31.3 G/DL (ref 32–36)
MCV RBC AUTO: 86.3 FL (ref 80–96)
MONOCYTES # BLD AUTO: 0.72 K/UL (ref 0–0.8)
MONOCYTES NFR BLD AUTO: 7.6 % (ref 2–6)
MPC BLD CALC-MCNC: 12.2 FL (ref 9.4–12.4)
NEUTROPHILS # BLD AUTO: 6.1 K/UL (ref 1.8–7.7)
NEUTROPHILS NFR BLD AUTO: 64.3 % (ref 53–65)
NRBC # BLD AUTO: 0 X10E3/UL
NRBC, AUTO (.00): 0 %
PHOSPHATE SERPL-MCNC: 5.2 MG/DL (ref 2.5–4.5)
PLATELET # BLD AUTO: 222 K/UL (ref 150–400)
POTASSIUM SERPL-SCNC: 4.1 MMOL/L (ref 3.5–5.1)
RBC # BLD AUTO: 3.22 M/UL (ref 4.6–6.2)
SODIUM SERPL-SCNC: 141 MMOL/L (ref 136–145)
WBC # BLD AUTO: 9.49 K/UL (ref 4.5–11)

## 2023-01-02 PROCEDURE — 85025 COMPLETE CBC W/AUTO DIFF WBC: CPT | Performed by: FAMILY MEDICINE

## 2023-01-02 PROCEDURE — 96376 TX/PRO/DX INJ SAME DRUG ADON: CPT | Mod: 59

## 2023-01-02 PROCEDURE — 99232 SBSQ HOSP IP/OBS MODERATE 35: CPT | Mod: ,,, | Performed by: FAMILY MEDICINE

## 2023-01-02 PROCEDURE — 36415 COLL VENOUS BLD VENIPUNCTURE: CPT | Performed by: FAMILY MEDICINE

## 2023-01-02 PROCEDURE — 82962 GLUCOSE BLOOD TEST: CPT

## 2023-01-02 PROCEDURE — 11000001 HC ACUTE MED/SURG PRIVATE ROOM

## 2023-01-02 PROCEDURE — 99232 PR SUBSEQUENT HOSPITAL CARE,LEVL II: ICD-10-PCS | Mod: ,,, | Performed by: FAMILY MEDICINE

## 2023-01-02 PROCEDURE — 63600175 PHARM REV CODE 636 W HCPCS: Performed by: INTERNAL MEDICINE

## 2023-01-02 PROCEDURE — 25000003 PHARM REV CODE 250: Performed by: FAMILY MEDICINE

## 2023-01-02 PROCEDURE — 25000003 PHARM REV CODE 250: Performed by: INTERNAL MEDICINE

## 2023-01-02 PROCEDURE — 96372 THER/PROPH/DIAG INJ SC/IM: CPT | Mod: 59 | Performed by: FAMILY MEDICINE

## 2023-01-02 PROCEDURE — G0378 HOSPITAL OBSERVATION PER HR: HCPCS

## 2023-01-02 PROCEDURE — G0257 UNSCHED DIALYSIS ESRD PT HOS: HCPCS

## 2023-01-02 PROCEDURE — 80069 RENAL FUNCTION PANEL: CPT | Performed by: FAMILY MEDICINE

## 2023-01-02 PROCEDURE — 90935 HEMODIALYSIS ONE EVALUATION: CPT

## 2023-01-02 PROCEDURE — 63600175 PHARM REV CODE 636 W HCPCS: Performed by: FAMILY MEDICINE

## 2023-01-02 RX ORDER — SODIUM CHLORIDE 9 MG/ML
INJECTION, SOLUTION INTRAVENOUS
Status: DISCONTINUED | OUTPATIENT
Start: 2023-01-02 | End: 2023-01-08 | Stop reason: HOSPADM

## 2023-01-02 RX ORDER — MUPIROCIN 20 MG/G
OINTMENT TOPICAL 2 TIMES DAILY
Status: COMPLETED | OUTPATIENT
Start: 2023-01-02 | End: 2023-01-06

## 2023-01-02 RX ADMIN — INSULIN DETEMIR 7 UNITS: 100 INJECTION, SOLUTION SUBCUTANEOUS at 12:01

## 2023-01-02 RX ADMIN — CARVEDILOL 12.5 MG: 12.5 TABLET, FILM COATED ORAL at 12:01

## 2023-01-02 RX ADMIN — INSULIN DETEMIR 7 UNITS: 100 INJECTION, SOLUTION SUBCUTANEOUS at 09:01

## 2023-01-02 RX ADMIN — CARVEDILOL 12.5 MG: 12.5 TABLET, FILM COATED ORAL at 09:01

## 2023-01-02 RX ADMIN — MUPIROCIN: 20 OINTMENT TOPICAL at 12:01

## 2023-01-02 RX ADMIN — FUROSEMIDE 40 MG: 10 INJECTION, SOLUTION INTRAMUSCULAR; INTRAVENOUS at 06:01

## 2023-01-02 RX ADMIN — HEPARIN SODIUM 5000 UNITS: 5000 INJECTION, SOLUTION INTRAVENOUS; SUBCUTANEOUS at 05:01

## 2023-01-02 RX ADMIN — HEPARIN SODIUM 5000 UNITS: 5000 INJECTION, SOLUTION INTRAVENOUS; SUBCUTANEOUS at 12:01

## 2023-01-02 RX ADMIN — ATORVASTATIN CALCIUM 80 MG: 80 TABLET, FILM COATED ORAL at 12:01

## 2023-01-02 RX ADMIN — HEPARIN SODIUM 5000 UNITS: 5000 INJECTION, SOLUTION INTRAVENOUS; SUBCUTANEOUS at 09:01

## 2023-01-02 RX ADMIN — INSULIN ASPART 3 UNITS: 100 INJECTION, SOLUTION INTRAVENOUS; SUBCUTANEOUS at 05:01

## 2023-01-02 RX ADMIN — SODIUM CHLORIDE: 9 INJECTION, SOLUTION INTRAVENOUS at 12:01

## 2023-01-02 RX ADMIN — NITROGLYCERIN 0.5 INCH: 20 OINTMENT TOPICAL at 12:01

## 2023-01-02 RX ADMIN — EPOETIN ALFA-EPBX 10000 UNITS: 10000 INJECTION, SOLUTION INTRAVENOUS; SUBCUTANEOUS at 11:01

## 2023-01-02 RX ADMIN — NITROGLYCERIN 0.5 INCH: 20 OINTMENT TOPICAL at 05:01

## 2023-01-02 RX ADMIN — MUPIROCIN: 20 OINTMENT TOPICAL at 09:01

## 2023-01-02 RX ADMIN — AMLODIPINE BESYLATE 10 MG: 10 TABLET ORAL at 12:01

## 2023-01-02 RX ADMIN — FUROSEMIDE 40 MG: 10 INJECTION, SOLUTION INTRAMUSCULAR; INTRAVENOUS at 07:01

## 2023-01-02 RX ADMIN — HEPARIN SODIUM 5000 UNITS: 5000 INJECTION, SOLUTION INTRAVENOUS; SUBCUTANEOUS at 02:01

## 2023-01-02 RX ADMIN — INSULIN ASPART 1 UNITS: 100 INJECTION, SOLUTION INTRAVENOUS; SUBCUTANEOUS at 09:01

## 2023-01-02 RX ADMIN — CLOPIDOGREL BISULFATE 75 MG: 75 TABLET, FILM COATED ORAL at 12:01

## 2023-01-02 RX ADMIN — ASPIRIN 81 MG CHEWABLE TABLET 81 MG: 81 TABLET CHEWABLE at 12:01

## 2023-01-02 NOTE — PLAN OF CARE
Problem: Adult Inpatient Plan of Care  Goal: Plan of Care Review  Outcome: Ongoing, Progressing  Goal: Patient-Specific Goal (Individualized)  Outcome: Ongoing, Progressing  Goal: Absence of Hospital-Acquired Illness or Injury  Outcome: Ongoing, Progressing  Goal: Optimal Comfort and Wellbeing  Outcome: Ongoing, Progressing  Intervention: Provide Person-Centered Care  Flowsheets (Taken 1/2/2023 8792)  Trust Relationship/Rapport:   care explained   reassurance provided   choices provided   thoughts/feelings acknowledged   emotional support provided   empathic listening provided   questions answered   questions encouraged  Goal: Readiness for Transition of Care  Outcome: Ongoing, Progressing     Problem: Diabetes Comorbidity  Goal: Blood Glucose Level Within Targeted Range  Outcome: Ongoing, Progressing  Intervention: Monitor and Manage Glycemia  Flowsheets (Taken 1/2/2023 6600)  Glycemic Management: blood glucose monitored     Problem: Device-Related Complication Risk (Hemodialysis)  Goal: Safe, Effective Therapy Delivery  Outcome: Ongoing, Progressing     Problem: Hemodynamic Instability (Hemodialysis)  Goal: Effective Tissue Perfusion  Outcome: Ongoing, Progressing     Problem: Infection (Hemodialysis)  Goal: Absence of Infection Signs and Symptoms  Outcome: Ongoing, Progressing

## 2023-01-02 NOTE — SUBJECTIVE & OBJECTIVE
Interval History: No new complaints. Seen by Nephro.HD tomorrow. SOB improved.    Review of Systems  Objective:     Vital Signs (Most Recent):  Temp: 99.1 °F (37.3 °C) (01/01/23 1939)  Pulse: 62 (01/01/23 2055)  Resp: 18 (01/01/23 1939)  BP: (!) 188/85 (01/01/23 2055)  SpO2: 97 % (01/01/23 1939)   Vital Signs (24h Range):  Temp:  [98.4 °F (36.9 °C)-99.1 °F (37.3 °C)] 99.1 °F (37.3 °C)  Pulse:  [61-79] 62  Resp:  [18] 18  SpO2:  [95 %-99 %] 97 %  BP: (175-200)/(67-85) 188/85     Weight: 90.7 kg (200 lb)  Body mass index is 27.12 kg/m².  No intake or output data in the 24 hours ending 01/01/23 2239   Physical Exam  Vitals reviewed.   Constitutional:       General: He is not in acute distress.     Appearance: He is not toxic-appearing.   HENT:      Head: Normocephalic and atraumatic.      Right Ear: External ear normal.      Left Ear: External ear normal.      Nose:      Comments: Post op changes nose. Hx of cancer surgery and reconstruction.   Cardiovascular:      Rate and Rhythm: Normal rate and regular rhythm.      Heart sounds: Normal heart sounds.   Pulmonary:      Effort: Pulmonary effort is normal. No respiratory distress.      Breath sounds: Normal breath sounds. No wheezing.   Abdominal:      General: Abdomen is flat. Bowel sounds are normal. There is no distension.      Palpations: Abdomen is soft.      Tenderness: There is no abdominal tenderness.   Musculoskeletal:      Right lower leg: Edema present.      Left lower leg: Edema present.   Skin:     General: Skin is warm and dry.      Coloration: Skin is not jaundiced.      Findings: No bruising.   Neurological:      General: No focal deficit present.      Mental Status: He is alert and oriented to person, place, and time.      Comments: Subtle, mild confusion.       Significant Labs: All pertinent labs within the past 24 hours have been reviewed.  BMP:   Recent Labs   Lab 12/31/22  1313 01/01/23  0731   * 223*    142   K 4.7 4.2   CL 99 105    CO2 26 26   BUN 48* 51*   CREATININE 6.61* 6.88*   CALCIUM 8.7 8.5   MG 3.4*  --      CBC:   Recent Labs   Lab 12/31/22  1313 01/01/23  0731   WBC 10.29 9.64   HGB 10.3* 9.3*   HCT 31.1* 28.7*    247       Significant Imaging: I have reviewed all pertinent imaging results/findings within the past 24 hours.

## 2023-01-02 NOTE — NURSING
Iv infiltrated. Removed with catheter intact. Notified ASHLEY Shultz, charge nurse and she attempted to gain access and was unsuccessful. Will continue to attempt to gain IV access and pass on to next shift.

## 2023-01-02 NOTE — PROGRESS NOTES
Patient is seen on hemodialysis, he is tolerating this well, we will continue his treatment unchanged.      Physical exam general the patient is in no acute distress     Assessment/plan 1.  ESRD-will continue 3 times weekly dialysis  2.  DM  3.  HTN   4. SOB-this patient's breathing is doing better  5.  Dyslipidemia  6.  Anemia will start EPO, patient's hematocrit is around 29%

## 2023-01-03 LAB
ALBUMIN SERPL BCP-MCNC: 2.8 G/DL (ref 3.5–5)
ANION GAP SERPL CALCULATED.3IONS-SCNC: 15 MMOL/L (ref 7–16)
BASOPHILS # BLD AUTO: 0.05 K/UL (ref 0–0.2)
BASOPHILS NFR BLD AUTO: 0.6 % (ref 0–1)
BUN SERPL-MCNC: 33 MG/DL (ref 7–18)
BUN/CREAT SERPL: 7 (ref 6–20)
CALCIUM SERPL-MCNC: 8.8 MG/DL (ref 8.5–10.1)
CHLORIDE SERPL-SCNC: 104 MMOL/L (ref 98–107)
CO2 SERPL-SCNC: 25 MMOL/L (ref 21–32)
CREAT SERPL-MCNC: 4.88 MG/DL (ref 0.7–1.3)
DIFFERENTIAL METHOD BLD: ABNORMAL
EGFR (NO RACE VARIABLE) (RUSH/TITUS): 12 ML/MIN/1.73M²
EOSINOPHIL # BLD AUTO: 0.36 K/UL (ref 0–0.5)
EOSINOPHIL NFR BLD AUTO: 4.4 % (ref 1–4)
ERYTHROCYTE [DISTWIDTH] IN BLOOD BY AUTOMATED COUNT: 13.2 % (ref 11.5–14.5)
GLUCOSE SERPL-MCNC: 186 MG/DL (ref 70–105)
GLUCOSE SERPL-MCNC: 194 MG/DL (ref 74–106)
GLUCOSE SERPL-MCNC: 208 MG/DL (ref 70–105)
GLUCOSE SERPL-MCNC: 276 MG/DL (ref 70–105)
GLUCOSE SERPL-MCNC: 321 MG/DL (ref 70–105)
GLUCOSE SERPL-MCNC: 336 MG/DL (ref 70–105)
HCT VFR BLD AUTO: 30.3 % (ref 40–54)
HGB BLD-MCNC: 9.6 G/DL (ref 13.5–18)
IMM GRANULOCYTES # BLD AUTO: 0.02 K/UL (ref 0–0.04)
IMM GRANULOCYTES NFR BLD: 0.2 % (ref 0–0.4)
LYMPHOCYTES # BLD AUTO: 2.13 K/UL (ref 1–4.8)
LYMPHOCYTES NFR BLD AUTO: 26.3 % (ref 27–41)
MCH RBC QN AUTO: 27.7 PG (ref 27–31)
MCHC RBC AUTO-ENTMCNC: 31.7 G/DL (ref 32–36)
MCV RBC AUTO: 87.3 FL (ref 80–96)
MONOCYTES # BLD AUTO: 0.75 K/UL (ref 0–0.8)
MONOCYTES NFR BLD AUTO: 9.2 % (ref 2–6)
MPC BLD CALC-MCNC: 12.3 FL (ref 9.4–12.4)
NEUTROPHILS # BLD AUTO: 4.8 K/UL (ref 1.8–7.7)
NEUTROPHILS NFR BLD AUTO: 59.3 % (ref 53–65)
NRBC # BLD AUTO: 0 X10E3/UL
NRBC, AUTO (.00): 0 %
PHOSPHATE SERPL-MCNC: 3.8 MG/DL (ref 2.5–4.5)
PLATELET # BLD AUTO: 253 K/UL (ref 150–400)
POTASSIUM SERPL-SCNC: 3.9 MMOL/L (ref 3.5–5.1)
RBC # BLD AUTO: 3.47 M/UL (ref 4.6–6.2)
SODIUM SERPL-SCNC: 140 MMOL/L (ref 136–145)
WBC # BLD AUTO: 8.11 K/UL (ref 4.5–11)

## 2023-01-03 PROCEDURE — 85025 COMPLETE CBC W/AUTO DIFF WBC: CPT | Performed by: FAMILY MEDICINE

## 2023-01-03 PROCEDURE — 36415 COLL VENOUS BLD VENIPUNCTURE: CPT | Performed by: FAMILY MEDICINE

## 2023-01-03 PROCEDURE — G0378 HOSPITAL OBSERVATION PER HR: HCPCS

## 2023-01-03 PROCEDURE — 82962 GLUCOSE BLOOD TEST: CPT

## 2023-01-03 PROCEDURE — 63600175 PHARM REV CODE 636 W HCPCS: Performed by: FAMILY MEDICINE

## 2023-01-03 PROCEDURE — 96372 THER/PROPH/DIAG INJ SC/IM: CPT | Performed by: FAMILY MEDICINE

## 2023-01-03 PROCEDURE — 80069 RENAL FUNCTION PANEL: CPT | Performed by: FAMILY MEDICINE

## 2023-01-03 PROCEDURE — 99232 SBSQ HOSP IP/OBS MODERATE 35: CPT | Mod: ,,, | Performed by: FAMILY MEDICINE

## 2023-01-03 PROCEDURE — 11000001 HC ACUTE MED/SURG PRIVATE ROOM

## 2023-01-03 PROCEDURE — 25000003 PHARM REV CODE 250: Performed by: FAMILY MEDICINE

## 2023-01-03 PROCEDURE — 86704 HEP B CORE ANTIBODY TOTAL: CPT | Mod: 90 | Performed by: FAMILY MEDICINE

## 2023-01-03 PROCEDURE — 96376 TX/PRO/DX INJ SAME DRUG ADON: CPT

## 2023-01-03 PROCEDURE — 99232 PR SUBSEQUENT HOSPITAL CARE,LEVL II: ICD-10-PCS | Mod: ,,, | Performed by: FAMILY MEDICINE

## 2023-01-03 RX ADMIN — MUPIROCIN: 20 OINTMENT TOPICAL at 09:01

## 2023-01-03 RX ADMIN — INSULIN ASPART 2 UNITS: 100 INJECTION, SOLUTION INTRAVENOUS; SUBCUTANEOUS at 06:01

## 2023-01-03 RX ADMIN — CARVEDILOL 12.5 MG: 12.5 TABLET, FILM COATED ORAL at 09:01

## 2023-01-03 RX ADMIN — FUROSEMIDE 40 MG: 10 INJECTION, SOLUTION INTRAMUSCULAR; INTRAVENOUS at 06:01

## 2023-01-03 RX ADMIN — HEPARIN SODIUM 5000 UNITS: 5000 INJECTION, SOLUTION INTRAVENOUS; SUBCUTANEOUS at 01:01

## 2023-01-03 RX ADMIN — INSULIN ASPART 4 UNITS: 100 INJECTION, SOLUTION INTRAVENOUS; SUBCUTANEOUS at 11:01

## 2023-01-03 RX ADMIN — AMLODIPINE BESYLATE 10 MG: 10 TABLET ORAL at 09:01

## 2023-01-03 RX ADMIN — CLOPIDOGREL BISULFATE 75 MG: 75 TABLET, FILM COATED ORAL at 09:01

## 2023-01-03 RX ADMIN — INSULIN DETEMIR 7 UNITS: 100 INJECTION, SOLUTION SUBCUTANEOUS at 09:01

## 2023-01-03 RX ADMIN — ASPIRIN 81 MG CHEWABLE TABLET 81 MG: 81 TABLET CHEWABLE at 09:01

## 2023-01-03 RX ADMIN — HEPARIN SODIUM 5000 UNITS: 5000 INJECTION, SOLUTION INTRAVENOUS; SUBCUTANEOUS at 09:01

## 2023-01-03 RX ADMIN — INSULIN ASPART 2 UNITS: 100 INJECTION, SOLUTION INTRAVENOUS; SUBCUTANEOUS at 09:01

## 2023-01-03 RX ADMIN — ATORVASTATIN CALCIUM 80 MG: 80 TABLET, FILM COATED ORAL at 09:01

## 2023-01-03 RX ADMIN — HEPARIN SODIUM 5000 UNITS: 5000 INJECTION, SOLUTION INTRAVENOUS; SUBCUTANEOUS at 06:01

## 2023-01-03 RX ADMIN — INSULIN ASPART 3 UNITS: 100 INJECTION, SOLUTION INTRAVENOUS; SUBCUTANEOUS at 06:01

## 2023-01-03 NOTE — SUBJECTIVE & OBJECTIVE
Interval History: Seen on HD tolerating well. SOB has     Review of Systems   Respiratory:  Negative for cough and shortness of breath.    Cardiovascular:  Negative for chest pain.   Gastrointestinal:  Negative for abdominal pain.   Objective:     Vital Signs (Most Recent):  Temp: 99 °F (37.2 °C) (01/02/23 2000)  Pulse: 73 (01/02/23 2000)  Resp: 18 (01/02/23 2000)  BP: (!) 179/84 (01/02/23 2000)  SpO2: 95 % (01/02/23 2000)   Vital Signs (24h Range):  Temp:  [98.5 °F (36.9 °C)-99.2 °F (37.3 °C)] 99 °F (37.2 °C)  Pulse:  [61-79] 73  Resp:  [18-20] 18  SpO2:  [95 %-98 %] 95 %  BP: (164-228)/(71-98) 179/84     Weight: 90.7 kg (200 lb)  Body mass index is 27.12 kg/m².    Intake/Output Summary (Last 24 hours) at 1/2/2023 2115  Last data filed at 1/2/2023 1809  Gross per 24 hour   Intake 1200 ml   Output 4825 ml   Net -3625 ml      Physical Exam  Vitals reviewed.   Constitutional:       General: He is not in acute distress.     Appearance: He is not toxic-appearing.   Cardiovascular:      Rate and Rhythm: Normal rate and regular rhythm.      Heart sounds: Normal heart sounds.   Pulmonary:      Effort: Pulmonary effort is normal. No respiratory distress.      Breath sounds: Normal breath sounds. No wheezing.   Skin:     General: Skin is warm and dry.   Neurological:      Mental Status: He is alert.       Significant Labs: All pertinent labs within the past 24 hours have been reviewed.  BMP:   Recent Labs   Lab 01/02/23  0531   *      K 4.1      CO2 25   BUN 51*   CREATININE 7.35*   CALCIUM 8.5     CBC:   Recent Labs   Lab 01/01/23  0731 01/02/23  0531   WBC 9.64 9.49   HGB 9.3* 8.7*   HCT 28.7* 27.8*    222       Significant Imaging: I have reviewed all pertinent imaging results/findings within the past 24 hours.

## 2023-01-03 NOTE — PROGRESS NOTES
Patient shortness of breath.  Review of systems GI he denies nausea or vomiting    Physical exam general the patient is in no acute distress , he has no pitting edema    Assessment/plan 1.  ESRD-will continue 3 times weekly dialysis  2.  DM  3.  HTN   4. SOB-this has resolved  5.  Dyslipidemia  6.  Anemia continue EPO

## 2023-01-03 NOTE — NURSING
2350 patient bp 194/91 pulse 75, patient asymptomatic, Dr. Rockwell notified via secure chat, instructed to continue to monitor patient since he is asymptomatic. Will continue to monitor and report.

## 2023-01-03 NOTE — PLAN OF CARE
Consult to setup outpt dialysis since patient was fired from [facility] oupt dialysis clinic, spoke with [facility], pt has been setup at [facility] dialysis [place], needed clinicals faxed to jacinda bhatia at [facility] dialysis [phone], also spoke with coby at [facility] dialysis [facility] [phone] and they are waiting on all clinicals from [facility] to complete admission process, [facility] will notify  when dialysis is setup

## 2023-01-03 NOTE — PLAN OF CARE
Problem: Adult Inpatient Plan of Care  Goal: Absence of Hospital-Acquired Illness or Injury  Outcome: Ongoing, Progressing  Intervention: Identify and Manage Fall Risk  Flowsheets (Taken 1/3/2023 1323)  Safety Promotion/Fall Prevention:   assistive device/personal item within reach   side rails raised x 3   diversional activities provided   Fall Risk signage in place   commode/urinal/bedpan at bedside  Intervention: Prevent Skin Injury  Flowsheets (Taken 1/3/2023 1323)  Body Position: position changed independently  Goal: Optimal Comfort and Wellbeing  Outcome: Ongoing, Progressing  Intervention: Provide Person-Centered Care  Flowsheets (Taken 1/3/2023 1323)  Trust Relationship/Rapport:   care explained   choices provided     Problem: Diabetes Comorbidity  Goal: Blood Glucose Level Within Targeted Range  Outcome: Ongoing, Progressing  Intervention: Monitor and Manage Glycemia  Flowsheets (Taken 1/3/2023 1323)  Glycemic Management:   blood glucose monitored   oral hydration promoted   supplemental insulin given

## 2023-01-03 NOTE — PLAN OF CARE
Problem: Adult Inpatient Plan of Care  Goal: Optimal Comfort and Wellbeing  Outcome: Ongoing, Progressing     Problem: Diabetes Comorbidity  Goal: Blood Glucose Level Within Targeted Range  Outcome: Ongoing, Progressing     Problem: Infection (Hemodialysis)  Goal: Absence of Infection Signs and Symptoms  Outcome: Ongoing, Progressing

## 2023-01-03 NOTE — PROGRESS NOTES
[facility] Hospital Medicine  Progress Note    Patient Name: Leanna Harrison  MRN: [id number]  Patient Class: IP- Inpatient   Admission Date: 12/31/2022  Length of Stay: 2 days  Attending Physician: Eleazar Singh Jr., MD  Primary Care Provider: Primary Doctor No        Subjective:     Principal Problem:ESRD (end stage renal disease)        HPI:  71 y/o WM with ESRD on HD for about 1 year.  Nephrologist Sven Bernal.  Has missed last 2 HD sessions as he has been dismissed as they found a firearm in his pocket.  Patient states he forgot to leave it in his car.  Regardless patient with some mild SOB today. Currently feels fine and would like to go home but has no where to get HD.   No CP but troponin elevated. We are asked to admit to arrange HD.       Overview/Hospital Course:  No notes on file    Interval History: Seen on HD tolerating well. SOB has     Review of Systems   Respiratory:  Negative for cough and shortness of breath.    Cardiovascular:  Negative for chest pain.   Gastrointestinal:  Negative for abdominal pain.   Objective:     Vital Signs (Most Recent):  Temp: 99 °F (37.2 °C) (01/02/23 2000)  Pulse: 73 (01/02/23 2000)  Resp: 18 (01/02/23 2000)  BP: (!) 179/84 (01/02/23 2000)  SpO2: 95 % (01/02/23 2000)   Vital Signs (24h Range):  Temp:  [98.5 °F (36.9 °C)-99.2 °F (37.3 °C)] 99 °F (37.2 °C)  Pulse:  [61-79] 73  Resp:  [18-20] 18  SpO2:  [95 %-98 %] 95 %  BP: (164-228)/(71-98) 179/84     Weight: 90.7 kg (200 lb)  Body mass index is 27.12 kg/m².    Intake/Output Summary (Last 24 hours) at 1/2/2023 2115  Last data filed at 1/2/2023 1809  Gross per 24 hour   Intake 1200 ml   Output 4825 ml   Net -3625 ml      Physical Exam  Vitals reviewed.   Constitutional:       General: He is not in acute distress.     Appearance: He is not toxic-appearing.   Cardiovascular:      Rate and Rhythm: Normal rate and regular rhythm.      Heart sounds: Normal heart sounds.   Pulmonary:      Effort:  Pulmonary effort is normal. No respiratory distress.      Breath sounds: Normal breath sounds. No wheezing.   Skin:     General: Skin is warm and dry.   Neurological:      Mental Status: He is alert.       Significant Labs: All pertinent labs within the past 24 hours have been reviewed.  BMP:   Recent Labs   Lab 01/02/23  0531   *      K 4.1      CO2 25   BUN 51*   CREATININE 7.35*   CALCIUM 8.5     CBC:   Recent Labs   Lab 01/01/23  0731 01/02/23  0531   WBC 9.64 9.49   HGB 9.3* 8.7*   HCT 28.7* 27.8*    222       Significant Imaging: I have reviewed all pertinent imaging results/findings within the past 24 hours.      Assessment/Plan:      * ESRD (end stage renal disease)    Currently without an outpatient chair. Has missed 2 sessions, Bun/cr only 48/6.6.  Will have to work on this after the holiday.  No indication for urgent HD tonight. Will use nitropaste to relieve any pulmonary congestion.  Give a dose of lokelma,  K 4.7. Still producing some urine will give lasix 40 bid and place on fluid restriction. Consult nephrology tomorrow.     1/2 - Getting HD.  Home when outpatient chair arranged.     Dyspnea    Likely related to fluid accumulation. See plan for ESRD.  Troponin elevated x 1. Will recheck. No Chest pain.       VTE Risk Mitigation (From admission, onward)         Ordered     heparin (porcine) injection 5,000 Units  Every 8 hours         12/31/22 1737     IP VTE HIGH RISK PATIENT  Once         12/31/22 1737     Place sequential compression device  Until discontinued         12/31/22 1737                Discharge Planning   DEEPIKA:      Code Status: Full Code   Is the patient medically ready for discharge?:     Reason for patient still in hospital (select all that apply): Treatment                     Eleazar Singh Jr, MD  Department of Hospital Medicine   [facility]

## 2023-01-03 NOTE — PLAN OF CARE
[facility]  Initial Discharge Assessment       Primary Care Provider: Primary Doctor No    Admission Diagnosis: Dyspnea [R06.00]  ESRD (end stage renal disease) [N18.6]  Chest pain [R07.9]  Dyspnea, unspecified type [R06.00]    Admission Date: 12/31/2022  Expected Discharge Date:     Discharge Barriers Identified: None    Payor: [employer] / Plan: [employer] MEDICARE HMO / Product Type: Medicare Advantage /     Extended Emergency Contact Information  Primary Emergency Contact: RAJANI FORMAN  Address: [address] [place] [address] [place]  Mobile Phone: [phone]  Relation: Brother  Preferred language: English   needed? No    Discharge Plan A: Home with family  Discharge Plan B: Home with family      [id number] [place] - [address] AT [facility]  [address]  [facility] [address]  Phone: [phone] Fax: [phone]    The [facility] at [address]  [address] [place] [id number]  Phone: [phone] Fax: [phone]      Initial Assessment (most recent)       Adult Discharge Assessment - 01/03/23 1426          Discharge Assessment    Assessment Type Discharge Planning Assessment     Source of Information patient     People in Home sibling(s)     Do you expect to return to your current living situation? Yes     Do you have help at home or someone to help you manage your care at home? Yes     Prior to hospitilization cognitive status: Alert/Oriented     Current cognitive status: Alert/Oriented     Equipment Currently Used at Home walker, rolling;wheelchair;power chair;nebulizer     Patient currently being followed by outpatient case management? No     Do you currently have service(s) that help you manage your care at home? No     Do you take prescription medications? Yes     Do you have prescription coverage? Yes     Do you have any problems affording any of your prescribed medications? No      Is the patient taking medications as prescribed? yes     How do you get to doctors appointments? family or friend will provide     Are you on dialysis? Yes     Dialysis Name and Scheduled days [place]     Do you take coumadin? No     Discharge Plan A Home with family     Discharge Plan B Home with family     DME Needed Upon Discharge  none     Discharge Plan discussed with: Patient     Discharge Barriers Identified None        Physical Activity    On average, how many days per week do you engage in moderate to strenuous exercise (like a brisk walk)? 5 days     On average, how many minutes do you engage in exercise at this level? 20 min        Financial Resource Strain    How hard is it for you to pay for the very basics like food, housing, medical care, and heating? Not hard at all        Housing Stability    In the last 12 months, was there a time when you were not able to pay the mortgage or rent on time? No     In the last 12 months, how many places have you lived? 1     In the last 12 months, was there a time when you did not have a steady place to sleep or slept in a shelter (including now)? No        Transportation Needs    In the past 12 months, has lack of transportation kept you from medical appointments or from getting medications? No     In the past 12 months, has lack of transportation kept you from meetings, work, or from getting things needed for daily living? No        Food Insecurity    Within the past 12 months, you worried that your food would run out before you got the money to buy more. Never true     Within the past 12 months, the food you bought just didn't last and you didn't have money to get more. Never true        Stress    Do you feel stress - tense, restless, nervous, or anxious, or unable to sleep at night because your mind is troubled all the time - these days? Only a little        Social Connections    In a typical week, how many times do you talk on the phone  with family, friends, or neighbors? More than three times a week     How often do you get together with friends or relatives? More than three times a week     How often do you attend Congregation or Muslim services? More than 4 times per year     Do you belong to any clubs or organizations such as Congregation groups, unions, fraternal or athletic groups, or school groups? Yes     How often do you attend meetings of the clubs or organizations you belong to? More than 4 times per year     Are you , , , , never , or living with a partner?         Alcohol Use    Q1: How often do you have a drink containing alcohol? Monthly or less     Q2: How many drinks containing alcohol do you have on a typical day when you are drinking? 1 or 2     Q3: How often do you have six or more drinks on one occasion? Never                   Pt lives home with brother, no hh pta, has needed dme, dc plan home, sdoh completed, im signed, following for dc needs, waiting on dialysis to be setup at [facility] [place]

## 2023-01-03 NOTE — ASSESSMENT & PLAN NOTE
Currently without an outpatient chair. Has missed 2 sessions, Bun/cr only 48/6.6.  Will have to work on this after the holiday.  No indication for urgent HD tonight. Will use nitropaste to relieve any pulmonary congestion.  Give a dose of lokelma,  K 4.7. Still producing some urine will give lasix 40 bid and place on fluid restriction. Consult nephrology tomorrow.     1/2 - Getting HD.  Home when outpatient chair arranged.

## 2023-01-03 NOTE — PROGRESS NOTES
[facility] Medicine  Progress Note    Patient Name: Leanna Harrison  MRN: [id number]  Patient Class: IP- Inpatient   Admission Date: 12/31/2022  Length of Stay: 2 days  Attending Physician: Eleazar Singh Jr., MD  Primary Care Provider: Primary Doctor No        Subjective:     Principal Problem:ESRD (end stage renal disease)        HPI:  73 y/o WM with ESRD on HD for about 1 year.  Nephrologist Sven Bernal.  Has missed last 2 HD sessions as he has been dismissed as they found a firearm in his pocket.  Patient states he forgot to leave it in his car.  Regardless patient with some mild SOB today. Currently feels fine and would like to go home but has no where to get HD.   No CP but troponin elevated. We are asked to admit to arrange HD.       Overview/Hospital Course:  No notes on file    Interval History: No new complaints. Seen by Nephro.HD tomorrow. SOB improved.    Review of Systems  Objective:     Vital Signs (Most Recent):  Temp: 99.1 °F (37.3 °C) (01/01/23 1939)  Pulse: 62 (01/01/23 2055)  Resp: 18 (01/01/23 1939)  BP: (!) 188/85 (01/01/23 2055)  SpO2: 97 % (01/01/23 1939)   Vital Signs (24h Range):  Temp:  [98.4 °F (36.9 °C)-99.1 °F (37.3 °C)] 99.1 °F (37.3 °C)  Pulse:  [61-79] 62  Resp:  [18] 18  SpO2:  [95 %-99 %] 97 %  BP: (175-200)/(67-85) 188/85     Weight: 90.7 kg (200 lb)  Body mass index is 27.12 kg/m².  No intake or output data in the 24 hours ending 01/01/23 2239   Physical Exam  Vitals reviewed.   Constitutional:       General: He is not in acute distress.     Appearance: He is not toxic-appearing.   HENT:      Head: Normocephalic and atraumatic.      Right Ear: External ear normal.      Left Ear: External ear normal.      Nose:      Comments: Post op changes nose. Hx of cancer surgery and reconstruction.   Cardiovascular:      Rate and Rhythm: Normal rate and regular rhythm.      Heart sounds: Normal heart sounds.   Pulmonary:      Effort: Pulmonary effort is normal.  No respiratory distress.      Breath sounds: Normal breath sounds. No wheezing.   Abdominal:      General: Abdomen is flat. Bowel sounds are normal. There is no distension.      Palpations: Abdomen is soft.      Tenderness: There is no abdominal tenderness.   Musculoskeletal:      Right lower leg: Edema present.      Left lower leg: Edema present.   Skin:     General: Skin is warm and dry.      Coloration: Skin is not jaundiced.      Findings: No bruising.   Neurological:      General: No focal deficit present.      Mental Status: He is alert and oriented to person, place, and time.      Comments: Subtle, mild confusion.       Significant Labs: All pertinent labs within the past 24 hours have been reviewed.  BMP:   Recent Labs   Lab 12/31/22  1313 01/01/23  0731   * 223*    142   K 4.7 4.2   CL 99 105   CO2 26 26   BUN 48* 51*   CREATININE 6.61* 6.88*   CALCIUM 8.7 8.5   MG 3.4*  --      CBC:   Recent Labs   Lab 12/31/22  1313 01/01/23  0731   WBC 10.29 9.64   HGB 10.3* 9.3*   HCT 31.1* 28.7*    247       Significant Imaging: I have reviewed all pertinent imaging results/findings within the past 24 hours.      Assessment/Plan:      * ESRD (end stage renal disease)    Currently without an outpatient chair. Has missed 2 sessions, Bun/cr only 48/6.6.  Will have to work on this after the holiday.  No indication for urgent HD tonight. Will use nitropaste to relieve any pulmonary congestion.  Give a dose of lokelma,  K 4.7. Still producing some urine will give lasix 40 bid and place on fluid restriction. Consult nephrology tomorrow.     Dyspnea    Likely related to fluid accumulation. See plan for ESRD.  Troponin elevated x 1. Will recheck. No Chest pain.       VTE Risk Mitigation (From admission, onward)         Ordered     heparin (porcine) injection 5,000 Units  Every 8 hours         12/31/22 1737     IP VTE HIGH RISK PATIENT  Once         12/31/22 1737     Place sequential compression device  Until  discontinued         12/31/22 1737                Discharge Planning   DEEPIKA:      Code Status: Full Code   Is the patient medically ready for discharge?:     Reason for patient still in hospital (select all that apply): Treatment                     Eleazar Singh Jr, MD  Department of Hospital Medicine   [facility]

## 2023-01-04 LAB
ALBUMIN SERPL BCP-MCNC: 2.5 G/DL (ref 3.5–5)
ANION GAP SERPL CALCULATED.3IONS-SCNC: 17 MMOL/L (ref 7–16)
BASOPHILS # BLD AUTO: 0.06 K/UL (ref 0–0.2)
BASOPHILS NFR BLD AUTO: 0.7 % (ref 0–1)
BUN SERPL-MCNC: 46 MG/DL (ref 7–18)
BUN/CREAT SERPL: 8 (ref 6–20)
CALCIUM SERPL-MCNC: 8.5 MG/DL (ref 8.5–10.1)
CHLORIDE SERPL-SCNC: 104 MMOL/L (ref 98–107)
CO2 SERPL-SCNC: 23 MMOL/L (ref 21–32)
CREAT SERPL-MCNC: 5.96 MG/DL (ref 0.7–1.3)
DIFFERENTIAL METHOD BLD: ABNORMAL
EGFR (NO RACE VARIABLE) (RUSH/TITUS): 9 ML/MIN/1.73M²
EOSINOPHIL # BLD AUTO: 0.49 K/UL (ref 0–0.5)
EOSINOPHIL NFR BLD AUTO: 5.9 % (ref 1–4)
ERYTHROCYTE [DISTWIDTH] IN BLOOD BY AUTOMATED COUNT: 13.1 % (ref 11.5–14.5)
GLUCOSE SERPL-MCNC: 190 MG/DL (ref 70–105)
GLUCOSE SERPL-MCNC: 191 MG/DL (ref 70–105)
GLUCOSE SERPL-MCNC: 195 MG/DL (ref 74–106)
GLUCOSE SERPL-MCNC: 228 MG/DL (ref 70–105)
GLUCOSE SERPL-MCNC: 360 MG/DL (ref 70–105)
HCT VFR BLD AUTO: 28.8 % (ref 40–54)
HGB BLD-MCNC: 9.1 G/DL (ref 13.5–18)
IMM GRANULOCYTES # BLD AUTO: 0.02 K/UL (ref 0–0.04)
IMM GRANULOCYTES NFR BLD: 0.2 % (ref 0–0.4)
LYMPHOCYTES # BLD AUTO: 2.61 K/UL (ref 1–4.8)
LYMPHOCYTES NFR BLD AUTO: 31.3 % (ref 27–41)
MCH RBC QN AUTO: 27.8 PG (ref 27–31)
MCHC RBC AUTO-ENTMCNC: 31.6 G/DL (ref 32–36)
MCV RBC AUTO: 88.1 FL (ref 80–96)
MONOCYTES # BLD AUTO: 0.74 K/UL (ref 0–0.8)
MONOCYTES NFR BLD AUTO: 8.9 % (ref 2–6)
MPC BLD CALC-MCNC: 12.1 FL (ref 9.4–12.4)
NEUTROPHILS # BLD AUTO: 4.42 K/UL (ref 1.8–7.7)
NEUTROPHILS NFR BLD AUTO: 53 % (ref 53–65)
NRBC # BLD AUTO: 0 X10E3/UL
NRBC, AUTO (.00): 0 %
PHOSPHATE SERPL-MCNC: 4.6 MG/DL (ref 2.5–4.5)
PLATELET # BLD AUTO: 199 K/UL (ref 150–400)
POTASSIUM SERPL-SCNC: 3.8 MMOL/L (ref 3.5–5.1)
RBC # BLD AUTO: 3.27 M/UL (ref 4.6–6.2)
SODIUM SERPL-SCNC: 140 MMOL/L (ref 136–145)
WBC # BLD AUTO: 8.34 K/UL (ref 4.5–11)

## 2023-01-04 PROCEDURE — 80069 RENAL FUNCTION PANEL: CPT | Performed by: FAMILY MEDICINE

## 2023-01-04 PROCEDURE — 11000001 HC ACUTE MED/SURG PRIVATE ROOM

## 2023-01-04 PROCEDURE — 82962 GLUCOSE BLOOD TEST: CPT

## 2023-01-04 PROCEDURE — 85025 COMPLETE CBC W/AUTO DIFF WBC: CPT | Performed by: FAMILY MEDICINE

## 2023-01-04 PROCEDURE — 99232 SBSQ HOSP IP/OBS MODERATE 35: CPT | Mod: ,,, | Performed by: FAMILY MEDICINE

## 2023-01-04 PROCEDURE — 63600175 PHARM REV CODE 636 W HCPCS: Performed by: INTERNAL MEDICINE

## 2023-01-04 PROCEDURE — 25000003 PHARM REV CODE 250: Performed by: INTERNAL MEDICINE

## 2023-01-04 PROCEDURE — G0378 HOSPITAL OBSERVATION PER HR: HCPCS

## 2023-01-04 PROCEDURE — 25000003 PHARM REV CODE 250: Performed by: FAMILY MEDICINE

## 2023-01-04 PROCEDURE — 36415 COLL VENOUS BLD VENIPUNCTURE: CPT | Performed by: FAMILY MEDICINE

## 2023-01-04 PROCEDURE — 63600175 PHARM REV CODE 636 W HCPCS: Performed by: FAMILY MEDICINE

## 2023-01-04 PROCEDURE — G0257 UNSCHED DIALYSIS ESRD PT HOS: HCPCS

## 2023-01-04 PROCEDURE — 96372 THER/PROPH/DIAG INJ SC/IM: CPT | Performed by: FAMILY MEDICINE

## 2023-01-04 PROCEDURE — 96376 TX/PRO/DX INJ SAME DRUG ADON: CPT | Mod: 59

## 2023-01-04 PROCEDURE — 99232 PR SUBSEQUENT HOSPITAL CARE,LEVL II: ICD-10-PCS | Mod: ,,, | Performed by: FAMILY MEDICINE

## 2023-01-04 PROCEDURE — 90935 HEMODIALYSIS ONE EVALUATION: CPT

## 2023-01-04 RX ADMIN — INSULIN ASPART 5 UNITS: 100 INJECTION, SOLUTION INTRAVENOUS; SUBCUTANEOUS at 01:01

## 2023-01-04 RX ADMIN — CLOPIDOGREL BISULFATE 75 MG: 75 TABLET, FILM COATED ORAL at 01:01

## 2023-01-04 RX ADMIN — INSULIN DETEMIR 7 UNITS: 100 INJECTION, SOLUTION SUBCUTANEOUS at 10:01

## 2023-01-04 RX ADMIN — CARVEDILOL 12.5 MG: 12.5 TABLET, FILM COATED ORAL at 10:01

## 2023-01-04 RX ADMIN — ATORVASTATIN CALCIUM 80 MG: 80 TABLET, FILM COATED ORAL at 01:01

## 2023-01-04 RX ADMIN — NITROGLYCERIN 0.5 INCH: 20 OINTMENT TOPICAL at 06:01

## 2023-01-04 RX ADMIN — MUPIROCIN: 20 OINTMENT TOPICAL at 01:01

## 2023-01-04 RX ADMIN — SODIUM CHLORIDE 2000 ML: 9 INJECTION, SOLUTION INTRAVENOUS at 11:01

## 2023-01-04 RX ADMIN — HEPARIN SODIUM 5000 UNITS: 5000 INJECTION, SOLUTION INTRAVENOUS; SUBCUTANEOUS at 10:01

## 2023-01-04 RX ADMIN — AMLODIPINE BESYLATE 10 MG: 10 TABLET ORAL at 01:01

## 2023-01-04 RX ADMIN — INSULIN ASPART 1 UNITS: 100 INJECTION, SOLUTION INTRAVENOUS; SUBCUTANEOUS at 10:01

## 2023-01-04 RX ADMIN — HEPARIN SODIUM 5000 UNITS: 5000 INJECTION, SOLUTION INTRAVENOUS; SUBCUTANEOUS at 01:01

## 2023-01-04 RX ADMIN — HEPARIN SODIUM 5000 UNITS: 5000 INJECTION, SOLUTION INTRAVENOUS; SUBCUTANEOUS at 06:01

## 2023-01-04 RX ADMIN — FUROSEMIDE 40 MG: 10 INJECTION, SOLUTION INTRAMUSCULAR; INTRAVENOUS at 06:01

## 2023-01-04 RX ADMIN — CARVEDILOL 12.5 MG: 12.5 TABLET, FILM COATED ORAL at 01:01

## 2023-01-04 RX ADMIN — EPOETIN ALFA-EPBX 10000 UNITS: 10000 INJECTION, SOLUTION INTRAVENOUS; SUBCUTANEOUS at 10:01

## 2023-01-04 RX ADMIN — MUPIROCIN: 20 OINTMENT TOPICAL at 10:01

## 2023-01-04 RX ADMIN — ASPIRIN 81 MG CHEWABLE TABLET 81 MG: 81 TABLET CHEWABLE at 01:01

## 2023-01-04 NOTE — PROGRESS NOTES
[facility] Medicine  Progress Note    Patient Name: Leanna Harrison  MRN: [id number]  Patient Class: IP- Inpatient   Admission Date: 12/31/2022  Length of Stay: 3 days  Attending Physician: Eleazar Singh Jr., MD  Primary Care Provider: Primary Doctor No        Subjective:     Principal Problem:ESRD (end stage renal disease)        HPI:  71 y/o WM with ESRD on HD for about 1 year.  Nephrologist Sven Bernal.  Has missed last 2 HD sessions as he has been dismissed as they found a firearm in his pocket.  Patient states he forgot to leave it in his car.  Regardless patient with some mild SOB today. Currently feels fine and would like to go home but has no where to get HD.   No CP but troponin elevated. We are asked to admit to arrange HD.       Overview/Hospital Course:  No notes on file    Interval History: No complaints. SW working new HD center issue. Possibly [place].Waiting final word.     Review of Systems   Respiratory:  Negative for shortness of breath.    Cardiovascular:  Negative for chest pain.   Gastrointestinal:  Negative for abdominal distention and abdominal pain.   Objective:     Vital Signs (Most Recent):  Temp: 98.4 °F (36.9 °C) (01/03/23 2000)  Pulse: 67 (01/03/23 2000)  Resp: 20 (01/03/23 2000)  BP: (!) 141/69 (01/03/23 2000)  SpO2: 97 % (01/03/23 2000)   Vital Signs (24h Range):  Temp:  [98.4 °F (36.9 °C)-99.1 °F (37.3 °C)] 98.4 °F (36.9 °C)  Pulse:  [67-75] 67  Resp:  [18-20] 20  SpO2:  [94 %-98 %] 97 %  BP: (141-194)/(69-91) 141/69     Weight: 90.7 kg (200 lb)  Body mass index is 27.12 kg/m².    Intake/Output Summary (Last 24 hours) at 1/3/2023 2127  Last data filed at 1/3/2023 0638  Gross per 24 hour   Intake --   Output 800 ml   Net -800 ml      Physical Exam  Vitals reviewed.   Constitutional:       General: He is not in acute distress.     Appearance: He is not toxic-appearing.   Cardiovascular:      Rate and Rhythm: Normal rate and regular rhythm.      Heart  sounds: Normal heart sounds.   Pulmonary:      Effort: Pulmonary effort is normal. No respiratory distress.      Breath sounds: Normal breath sounds. No wheezing.   Skin:     General: Skin is warm and dry.   Neurological:      Mental Status: He is alert.       Significant Labs: All pertinent labs within the past 24 hours have been reviewed.  BMP:   Recent Labs   Lab 01/03/23 0426   *      K 3.9      CO2 25   BUN 33*   CREATININE 4.88*   CALCIUM 8.8     CBC:   Recent Labs   Lab 01/02/23  0531 01/03/23 0426   WBC 9.49 8.11   HGB 8.7* 9.6*   HCT 27.8* 30.3*    253       Significant Imaging: I have reviewed all pertinent imaging results/findings within the past 24 hours.      Assessment/Plan:      * ESRD (end stage renal disease)    Currently without an outpatient chair. Has missed 2 sessions, Bun/cr only 48/6.6.  Will have to work on this after the holiday.  No indication for urgent HD tonight. Will use nitropaste to relieve any pulmonary congestion.  Give a dose of lokelma,  K 4.7. Still producing some urine will give lasix 40 bid and place on fluid restriction. Consult nephrology tomorrow.     1/2 - Getting HD.  Home when outpatient chair arranged.     Dyspnea    Likely related to fluid accumulation. See plan for ESRD.  Troponin elevated x 1. Will recheck. No Chest pain.     1/3 - Resolved after HD      VTE Risk Mitigation (From admission, onward)         Ordered     heparin (porcine) injection 5,000 Units  Every 8 hours         12/31/22 1737     IP VTE HIGH RISK PATIENT  Once         12/31/22 1737     Place sequential compression device  Until discontinued         12/31/22 1737                Discharge Planning   DEEPIKA:      Code Status: Full Code   Is the patient medically ready for discharge?:     Reason for patient still in hospital (select all that apply): Treatment  Discharge Plan A: Home with family                  Eleazar Singh Jr, MD  Department of Hospital Medicine   [facility]

## 2023-01-04 NOTE — ASSESSMENT & PLAN NOTE
Likely related to fluid accumulation. See plan for ESRD.  Troponin elevated x 1. Will recheck. No Chest pain.     1/3 - Resolved after HD

## 2023-01-04 NOTE — SUBJECTIVE & OBJECTIVE
Interval History: No complaints. SW working new HD center issue. Possibly [place].Waiting final word.     Review of Systems   Respiratory:  Negative for shortness of breath.    Cardiovascular:  Negative for chest pain.   Gastrointestinal:  Negative for abdominal distention and abdominal pain.   Objective:     Vital Signs (Most Recent):  Temp: 98.4 °F (36.9 °C) (01/03/23 2000)  Pulse: 67 (01/03/23 2000)  Resp: 20 (01/03/23 2000)  BP: (!) 141/69 (01/03/23 2000)  SpO2: 97 % (01/03/23 2000)   Vital Signs (24h Range):  Temp:  [98.4 °F (36.9 °C)-99.1 °F (37.3 °C)] 98.4 °F (36.9 °C)  Pulse:  [67-75] 67  Resp:  [18-20] 20  SpO2:  [94 %-98 %] 97 %  BP: (141-194)/(69-91) 141/69     Weight: 90.7 kg (200 lb)  Body mass index is 27.12 kg/m².    Intake/Output Summary (Last 24 hours) at 1/3/2023 2127  Last data filed at 1/3/2023 0638  Gross per 24 hour   Intake --   Output 800 ml   Net -800 ml      Physical Exam  Vitals reviewed.   Constitutional:       General: He is not in acute distress.     Appearance: He is not toxic-appearing.   Cardiovascular:      Rate and Rhythm: Normal rate and regular rhythm.      Heart sounds: Normal heart sounds.   Pulmonary:      Effort: Pulmonary effort is normal. No respiratory distress.      Breath sounds: Normal breath sounds. No wheezing.   Skin:     General: Skin is warm and dry.   Neurological:      Mental Status: He is alert.       Significant Labs: All pertinent labs within the past 24 hours have been reviewed.  BMP:   Recent Labs   Lab 01/03/23  0426   *      K 3.9      CO2 25   BUN 33*   CREATININE 4.88*   CALCIUM 8.8     CBC:   Recent Labs   Lab 01/02/23  0531 01/03/23  0426   WBC 9.49 8.11   HGB 8.7* 9.6*   HCT 27.8* 30.3*    253       Significant Imaging: I have reviewed all pertinent imaging results/findings within the past 24 hours.

## 2023-01-04 NOTE — PLAN OF CARE
Ss spoke with [facility] and they requested chest xray and covid test. Info faxed. Awaiting decision re: admission. Ss following.

## 2023-01-05 LAB
ALBUMIN SERPL BCP-MCNC: 2.8 G/DL (ref 3.5–5)
ANION GAP SERPL CALCULATED.3IONS-SCNC: 16 MMOL/L (ref 7–16)
BASOPHILS # BLD AUTO: 0.06 K/UL (ref 0–0.2)
BASOPHILS NFR BLD AUTO: 0.7 % (ref 0–1)
BUN SERPL-MCNC: 34 MG/DL (ref 7–18)
BUN/CREAT SERPL: 7 (ref 6–20)
CALCIUM SERPL-MCNC: 9 MG/DL (ref 8.5–10.1)
CHLORIDE SERPL-SCNC: 104 MMOL/L (ref 98–107)
CO2 SERPL-SCNC: 24 MMOL/L (ref 21–32)
CREAT SERPL-MCNC: 4.95 MG/DL (ref 0.7–1.3)
DIFFERENTIAL METHOD BLD: ABNORMAL
EGFR (NO RACE VARIABLE) (RUSH/TITUS): 12 ML/MIN/1.73M²
EOSINOPHIL # BLD AUTO: 0.44 K/UL (ref 0–0.5)
EOSINOPHIL NFR BLD AUTO: 5.2 % (ref 1–4)
ERYTHROCYTE [DISTWIDTH] IN BLOOD BY AUTOMATED COUNT: 13.6 % (ref 11.5–14.5)
GLUCOSE SERPL-MCNC: 183 MG/DL (ref 70–105)
GLUCOSE SERPL-MCNC: 185 MG/DL (ref 70–105)
GLUCOSE SERPL-MCNC: 190 MG/DL (ref 74–106)
GLUCOSE SERPL-MCNC: 243 MG/DL (ref 70–105)
GLUCOSE SERPL-MCNC: 317 MG/DL (ref 70–105)
GLUCOSE SERPL-MCNC: 365 MG/DL (ref 70–105)
HCT VFR BLD AUTO: 32.5 % (ref 40–54)
HGB BLD-MCNC: 10.3 G/DL (ref 13.5–18)
IMM GRANULOCYTES # BLD AUTO: 0.02 K/UL (ref 0–0.04)
IMM GRANULOCYTES NFR BLD: 0.2 % (ref 0–0.4)
LYMPHOCYTES # BLD AUTO: 2.18 K/UL (ref 1–4.8)
LYMPHOCYTES NFR BLD AUTO: 25.7 % (ref 27–41)
MCH RBC QN AUTO: 27.7 PG (ref 27–31)
MCHC RBC AUTO-ENTMCNC: 31.7 G/DL (ref 32–36)
MCV RBC AUTO: 87.4 FL (ref 80–96)
MONOCYTES # BLD AUTO: 0.66 K/UL (ref 0–0.8)
MONOCYTES NFR BLD AUTO: 7.8 % (ref 2–6)
MPC BLD CALC-MCNC: 12.5 FL (ref 9.4–12.4)
NEUTROPHILS # BLD AUTO: 5.13 K/UL (ref 1.8–7.7)
NEUTROPHILS NFR BLD AUTO: 60.4 % (ref 53–65)
NRBC # BLD AUTO: 0 X10E3/UL
NRBC, AUTO (.00): 0 %
PHOSPHATE SERPL-MCNC: 4.2 MG/DL (ref 2.5–4.5)
PLATELET # BLD AUTO: 255 K/UL (ref 150–400)
POTASSIUM SERPL-SCNC: 4.1 MMOL/L (ref 3.5–5.1)
RBC # BLD AUTO: 3.72 M/UL (ref 4.6–6.2)
SODIUM SERPL-SCNC: 140 MMOL/L (ref 136–145)
WBC # BLD AUTO: 8.49 K/UL (ref 4.5–11)

## 2023-01-05 PROCEDURE — 82962 GLUCOSE BLOOD TEST: CPT | Mod: 91

## 2023-01-05 PROCEDURE — 85025 COMPLETE CBC W/AUTO DIFF WBC: CPT | Performed by: FAMILY MEDICINE

## 2023-01-05 PROCEDURE — 36415 COLL VENOUS BLD VENIPUNCTURE: CPT | Performed by: FAMILY MEDICINE

## 2023-01-05 PROCEDURE — 80069 RENAL FUNCTION PANEL: CPT | Performed by: FAMILY MEDICINE

## 2023-01-05 PROCEDURE — 63600175 PHARM REV CODE 636 W HCPCS: Performed by: FAMILY MEDICINE

## 2023-01-05 PROCEDURE — G0378 HOSPITAL OBSERVATION PER HR: HCPCS

## 2023-01-05 PROCEDURE — 11000001 HC ACUTE MED/SURG PRIVATE ROOM

## 2023-01-05 PROCEDURE — 25000003 PHARM REV CODE 250: Performed by: FAMILY MEDICINE

## 2023-01-05 PROCEDURE — 86704 HEP B CORE ANTIBODY TOTAL: CPT | Mod: 90 | Performed by: FAMILY MEDICINE

## 2023-01-05 PROCEDURE — 99231 SBSQ HOSP IP/OBS SF/LOW 25: CPT | Mod: ,,, | Performed by: FAMILY MEDICINE

## 2023-01-05 PROCEDURE — 96372 THER/PROPH/DIAG INJ SC/IM: CPT | Performed by: FAMILY MEDICINE

## 2023-01-05 PROCEDURE — 99231 PR SUBSEQUENT HOSPITAL CARE,LEVL I: ICD-10-PCS | Mod: ,,, | Performed by: FAMILY MEDICINE

## 2023-01-05 PROCEDURE — 96376 TX/PRO/DX INJ SAME DRUG ADON: CPT

## 2023-01-05 RX ADMIN — ASPIRIN 81 MG CHEWABLE TABLET 81 MG: 81 TABLET CHEWABLE at 08:01

## 2023-01-05 RX ADMIN — FUROSEMIDE 40 MG: 10 INJECTION, SOLUTION INTRAMUSCULAR; INTRAVENOUS at 06:01

## 2023-01-05 RX ADMIN — CLOPIDOGREL BISULFATE 75 MG: 75 TABLET, FILM COATED ORAL at 08:01

## 2023-01-05 RX ADMIN — CARVEDILOL 12.5 MG: 12.5 TABLET, FILM COATED ORAL at 08:01

## 2023-01-05 RX ADMIN — ATORVASTATIN CALCIUM 80 MG: 80 TABLET, FILM COATED ORAL at 08:01

## 2023-01-05 RX ADMIN — HEPARIN SODIUM 5000 UNITS: 5000 INJECTION, SOLUTION INTRAVENOUS; SUBCUTANEOUS at 02:01

## 2023-01-05 RX ADMIN — INSULIN ASPART 2 UNITS: 100 INJECTION, SOLUTION INTRAVENOUS; SUBCUTANEOUS at 09:01

## 2023-01-05 RX ADMIN — MUPIROCIN: 20 OINTMENT TOPICAL at 10:01

## 2023-01-05 RX ADMIN — INSULIN ASPART 5 UNITS: 100 INJECTION, SOLUTION INTRAVENOUS; SUBCUTANEOUS at 11:01

## 2023-01-05 RX ADMIN — CARVEDILOL 12.5 MG: 12.5 TABLET, FILM COATED ORAL at 09:01

## 2023-01-05 RX ADMIN — INSULIN DETEMIR 7 UNITS: 100 INJECTION, SOLUTION SUBCUTANEOUS at 09:01

## 2023-01-05 RX ADMIN — INSULIN ASPART 2 UNITS: 100 INJECTION, SOLUTION INTRAVENOUS; SUBCUTANEOUS at 06:01

## 2023-01-05 RX ADMIN — HEPARIN SODIUM 5000 UNITS: 5000 INJECTION, SOLUTION INTRAVENOUS; SUBCUTANEOUS at 09:01

## 2023-01-05 RX ADMIN — AMLODIPINE BESYLATE 10 MG: 10 TABLET ORAL at 08:01

## 2023-01-05 RX ADMIN — MUPIROCIN: 20 OINTMENT TOPICAL at 08:01

## 2023-01-05 RX ADMIN — HEPARIN SODIUM 5000 UNITS: 5000 INJECTION, SOLUTION INTRAVENOUS; SUBCUTANEOUS at 06:01

## 2023-01-05 NOTE — PLAN OF CARE
Problem: Adult Inpatient Plan of Care  Goal: Absence of Hospital-Acquired Illness or Injury  Outcome: Ongoing, Progressing  Goal: Optimal Comfort and Wellbeing  Outcome: Ongoing, Progressing     Problem: Diabetes Comorbidity  Goal: Blood Glucose Level Within Targeted Range  Outcome: Ongoing, Progressing  Intervention: Monitor and Manage Glycemia  Flowsheets (Taken 1/5/2023 6486)  Glycemic Management:   blood glucose monitored   oral hydration promoted   supplemental insulin given

## 2023-01-05 NOTE — PROGRESS NOTES
Patient denies n/v, [facility] pulm denies sob    Physical exam general the patient is in no acute distress, no edema    Assessment/plan 1.  ESRD-will continue 3 times weekly dialysis  2.  DM  3.  HTN   4. SOB-this patient's breathing is doing better  5.  Dyslipidemia  6.  Anemia cont. epo

## 2023-01-05 NOTE — SUBJECTIVE & OBJECTIVE
Interval History: No complaints. HD today. May have an outpatient HD option available by tomorrow.     Review of Systems  Objective:     Vital Signs (Most Recent):  Temp: 98.6 °F (37 °C) (01/04/23 1901)  Pulse: 67 (01/04/23 1901)  Resp: 18 (01/04/23 1901)  BP: (!) 151/69 (01/04/23 1901)  SpO2: 98 % (01/04/23 0714)   Vital Signs (24h Range):  Temp:  [98.4 °F (36.9 °C)-98.6 °F (37 °C)] 98.6 °F (37 °C)  Pulse:  [63-69] 67  Resp:  [18-19] 18  SpO2:  [97 %-98 %] 98 %  BP: (118-188)/(61-89) 151/69     Weight: 90.7 kg (200 lb)  Body mass index is 27.12 kg/m².    Intake/Output Summary (Last 24 hours) at 1/4/2023 2124  Last data filed at 1/4/2023 0336  Gross per 24 hour   Intake --   Output 900 ml   Net -900 ml      Physical Exam  Vitals reviewed.   Constitutional:       General: He is not in acute distress.     Appearance: He is not toxic-appearing.   Cardiovascular:      Rate and Rhythm: Normal rate and regular rhythm.      Heart sounds: Normal heart sounds.   Pulmonary:      Effort: Pulmonary effort is normal. No respiratory distress.      Breath sounds: Normal breath sounds. No wheezing.   Skin:     General: Skin is warm and dry.   Neurological:      Mental Status: He is alert.       Significant Labs: All pertinent labs within the past 24 hours have been reviewed.  BMP:   Recent Labs   Lab 01/04/23 0417   *      K 3.8      CO2 23   BUN 46*   CREATININE 5.96*   CALCIUM 8.5     CBC:   Recent Labs   Lab 01/03/23  0426 01/04/23 0417   WBC 8.11 8.34   HGB 9.6* 9.1*   HCT 30.3* 28.8*    199       Significant Imaging: I have reviewed all pertinent imaging results/findings within the past 24 hours.

## 2023-01-05 NOTE — PLAN OF CARE
Problem: Adult Inpatient Plan of Care  Goal: Optimal Comfort and Wellbeing  Outcome: Ongoing, Progressing     Problem: Diabetes Comorbidity  Goal: Blood Glucose Level Within Targeted Range  Outcome: Ongoing, Progressing     Problem: Device-Related Complication Risk (Hemodialysis)  Goal: Safe, Effective Therapy Delivery  Outcome: Ongoing, Progressing

## 2023-01-05 NOTE — PROGRESS NOTES
[facility] Medicine  Progress Note    Patient Name: Leanna Harrison  MRN: [id number]  Patient Class: IP- Inpatient   Admission Date: 12/31/2022  Length of Stay: 4 days  Attending Physician: Eleazar Singh Jr., MD  Primary Care Provider: Primary Doctor No        Subjective:     Principal Problem:ESRD (end stage renal disease)        HPI:  73 y/o WM with ESRD on HD for about 1 year.  Nephrologist Sven Bernal.  Has missed last 2 HD sessions as he has been dismissed as they found a firearm in his pocket.  Patient states he forgot to leave it in his car.  Regardless patient with some mild SOB today. Currently feels fine and would like to go home but has no where to get HD.   No CP but troponin elevated. We are asked to admit to arrange HD.       Overview/Hospital Course:  No notes on file    Interval History: No complaints. HD today. May have an outpatient HD option available by tomorrow.     Review of Systems  Objective:     Vital Signs (Most Recent):  Temp: 98.6 °F (37 °C) (01/04/23 1901)  Pulse: 67 (01/04/23 1901)  Resp: 18 (01/04/23 1901)  BP: (!) 151/69 (01/04/23 1901)  SpO2: 98 % (01/04/23 0714)   Vital Signs (24h Range):  Temp:  [98.4 °F (36.9 °C)-98.6 °F (37 °C)] 98.6 °F (37 °C)  Pulse:  [63-69] 67  Resp:  [18-19] 18  SpO2:  [97 %-98 %] 98 %  BP: (118-188)/(61-89) 151/69     Weight: 90.7 kg (200 lb)  Body mass index is 27.12 kg/m².    Intake/Output Summary (Last 24 hours) at 1/4/2023 2124  Last data filed at 1/4/2023 0336  Gross per 24 hour   Intake --   Output 900 ml   Net -900 ml      Physical Exam  Vitals reviewed.   Constitutional:       General: He is not in acute distress.     Appearance: He is not toxic-appearing.   Cardiovascular:      Rate and Rhythm: Normal rate and regular rhythm.      Heart sounds: Normal heart sounds.   Pulmonary:      Effort: Pulmonary effort is normal. No respiratory distress.      Breath sounds: Normal breath sounds. No wheezing.   Skin:      General: Skin is warm and dry.   Neurological:      Mental Status: He is alert.       Significant Labs: All pertinent labs within the past 24 hours have been reviewed.  BMP:   Recent Labs   Lab 01/04/23 0417   *      K 3.8      CO2 23   BUN 46*   CREATININE 5.96*   CALCIUM 8.5     CBC:   Recent Labs   Lab 01/03/23  0426 01/04/23 0417   WBC 8.11 8.34   HGB 9.6* 9.1*   HCT 30.3* 28.8*    199       Significant Imaging: I have reviewed all pertinent imaging results/findings within the past 24 hours.      Assessment/Plan:      * ESRD (end stage renal disease)    Currently without an outpatient chair. Has missed 2 sessions, Bun/cr only 48/6.6.  Will have to work on this after the holiday.  No indication for urgent HD tonight. Will use nitropaste to relieve any pulmonary congestion.  Give a dose of lokelma,  K 4.7. Still producing some urine will give lasix 40 bid and place on fluid restriction. Consult nephrology tomorrow.     1/2 - Getting HD.  Home when outpatient chair arranged.     Dyspnea    Likely related to fluid accumulation. See plan for ESRD.  Troponin elevated x 1. Will recheck. No Chest pain.     1/3 - Resolved after HD      VTE Risk Mitigation (From admission, onward)         Ordered     heparin (porcine) injection 5,000 Units  Every 8 hours         12/31/22 1737     IP VTE HIGH RISK PATIENT  Once         12/31/22 1737     Place sequential compression device  Until discontinued         12/31/22 1737                Discharge Planning   DEEPIKA:      Code Status: Full Code   Is the patient medically ready for discharge?:     Reason for patient still in hospital (select all that apply): Treatment  Discharge Plan A: Home with family                  Eleazar Singh Jr, MD  Department of Hospital Medicine   [facility]

## 2023-01-06 LAB
ALBUMIN SERPL BCP-MCNC: 2.8 G/DL (ref 3.5–5)
ANION GAP SERPL CALCULATED.3IONS-SCNC: 17 MMOL/L (ref 7–16)
BASOPHILS # BLD AUTO: 0.06 K/UL (ref 0–0.2)
BASOPHILS NFR BLD AUTO: 0.6 % (ref 0–1)
BUN SERPL-MCNC: 52 MG/DL (ref 7–18)
BUN/CREAT SERPL: 9 (ref 6–20)
CALCIUM SERPL-MCNC: 8.9 MG/DL (ref 8.5–10.1)
CHLORIDE SERPL-SCNC: 105 MMOL/L (ref 98–107)
CO2 SERPL-SCNC: 22 MMOL/L (ref 21–32)
CREAT SERPL-MCNC: 6.02 MG/DL (ref 0.7–1.3)
DIFFERENTIAL METHOD BLD: ABNORMAL
EGFR (NO RACE VARIABLE) (RUSH/TITUS): 9 ML/MIN/1.73M²
EOSINOPHIL # BLD AUTO: 0.58 K/UL (ref 0–0.5)
EOSINOPHIL NFR BLD AUTO: 5.5 % (ref 1–4)
ERYTHROCYTE [DISTWIDTH] IN BLOOD BY AUTOMATED COUNT: 13.4 % (ref 11.5–14.5)
GLUCOSE SERPL-MCNC: 176 MG/DL (ref 70–105)
GLUCOSE SERPL-MCNC: 186 MG/DL (ref 74–106)
GLUCOSE SERPL-MCNC: 213 MG/DL (ref 70–105)
GLUCOSE SERPL-MCNC: 256 MG/DL (ref 70–105)
GLUCOSE SERPL-MCNC: 325 MG/DL (ref 70–105)
HBV CORE AB SERPL QL IA: NEGATIVE
HCT VFR BLD AUTO: 33.1 % (ref 40–54)
HGB BLD-MCNC: 10.3 G/DL (ref 13.5–18)
IMM GRANULOCYTES # BLD AUTO: 0.04 K/UL (ref 0–0.04)
IMM GRANULOCYTES NFR BLD: 0.4 % (ref 0–0.4)
LYMPHOCYTES # BLD AUTO: 2.56 K/UL (ref 1–4.8)
LYMPHOCYTES NFR BLD AUTO: 24.2 % (ref 27–41)
MCH RBC QN AUTO: 28.1 PG (ref 27–31)
MCHC RBC AUTO-ENTMCNC: 31.1 G/DL (ref 32–36)
MCV RBC AUTO: 90.2 FL (ref 80–96)
MONOCYTES # BLD AUTO: 0.79 K/UL (ref 0–0.8)
MONOCYTES NFR BLD AUTO: 7.5 % (ref 2–6)
MPC BLD CALC-MCNC: 11.7 FL (ref 9.4–12.4)
NEUTROPHILS # BLD AUTO: 6.53 K/UL (ref 1.8–7.7)
NEUTROPHILS NFR BLD AUTO: 61.8 % (ref 53–65)
NRBC # BLD AUTO: 0 X10E3/UL
NRBC, AUTO (.00): 0 %
PHOSPHATE SERPL-MCNC: 4.5 MG/DL (ref 2.5–4.5)
PLATELET # BLD AUTO: 277 K/UL (ref 150–400)
POTASSIUM SERPL-SCNC: 4 MMOL/L (ref 3.5–5.1)
RBC # BLD AUTO: 3.67 M/UL (ref 4.6–6.2)
SODIUM SERPL-SCNC: 140 MMOL/L (ref 136–145)
WBC # BLD AUTO: 10.56 K/UL (ref 4.5–11)

## 2023-01-06 PROCEDURE — 96372 THER/PROPH/DIAG INJ SC/IM: CPT | Mod: 59 | Performed by: FAMILY MEDICINE

## 2023-01-06 PROCEDURE — 90935 HEMODIALYSIS ONE EVALUATION: CPT

## 2023-01-06 PROCEDURE — 63600175 PHARM REV CODE 636 W HCPCS: Performed by: FAMILY MEDICINE

## 2023-01-06 PROCEDURE — 99232 PR SUBSEQUENT HOSPITAL CARE,LEVL II: ICD-10-PCS | Mod: ,,, | Performed by: INTERNAL MEDICINE

## 2023-01-06 PROCEDURE — 25000003 PHARM REV CODE 250: Performed by: INTERNAL MEDICINE

## 2023-01-06 PROCEDURE — 11000001 HC ACUTE MED/SURG PRIVATE ROOM

## 2023-01-06 PROCEDURE — G0378 HOSPITAL OBSERVATION PER HR: HCPCS

## 2023-01-06 PROCEDURE — 99232 SBSQ HOSP IP/OBS MODERATE 35: CPT | Mod: ,,, | Performed by: INTERNAL MEDICINE

## 2023-01-06 PROCEDURE — 96376 TX/PRO/DX INJ SAME DRUG ADON: CPT | Mod: 59

## 2023-01-06 PROCEDURE — 25000003 PHARM REV CODE 250: Performed by: FAMILY MEDICINE

## 2023-01-06 PROCEDURE — 80069 RENAL FUNCTION PANEL: CPT | Performed by: FAMILY MEDICINE

## 2023-01-06 PROCEDURE — 36415 COLL VENOUS BLD VENIPUNCTURE: CPT | Performed by: FAMILY MEDICINE

## 2023-01-06 PROCEDURE — 96372 THER/PROPH/DIAG INJ SC/IM: CPT | Performed by: FAMILY MEDICINE

## 2023-01-06 PROCEDURE — G0257 UNSCHED DIALYSIS ESRD PT HOS: HCPCS

## 2023-01-06 PROCEDURE — 82962 GLUCOSE BLOOD TEST: CPT

## 2023-01-06 PROCEDURE — 63600175 PHARM REV CODE 636 W HCPCS: Performed by: INTERNAL MEDICINE

## 2023-01-06 PROCEDURE — 85025 COMPLETE CBC W/AUTO DIFF WBC: CPT | Performed by: FAMILY MEDICINE

## 2023-01-06 RX ADMIN — CLOPIDOGREL BISULFATE 75 MG: 75 TABLET, FILM COATED ORAL at 08:01

## 2023-01-06 RX ADMIN — EPOETIN ALFA-EPBX 10000 UNITS: 10000 INJECTION, SOLUTION INTRAVENOUS; SUBCUTANEOUS at 01:01

## 2023-01-06 RX ADMIN — HEPARIN SODIUM 5000 UNITS: 5000 INJECTION, SOLUTION INTRAVENOUS; SUBCUTANEOUS at 09:01

## 2023-01-06 RX ADMIN — SODIUM CHLORIDE: 9 INJECTION, SOLUTION INTRAVENOUS at 11:01

## 2023-01-06 RX ADMIN — INSULIN ASPART 2 UNITS: 100 INJECTION, SOLUTION INTRAVENOUS; SUBCUTANEOUS at 02:01

## 2023-01-06 RX ADMIN — INSULIN ASPART 4 UNITS: 100 INJECTION, SOLUTION INTRAVENOUS; SUBCUTANEOUS at 06:01

## 2023-01-06 RX ADMIN — NITROGLYCERIN 0.5 INCH: 20 OINTMENT TOPICAL at 06:01

## 2023-01-06 RX ADMIN — ASPIRIN 81 MG CHEWABLE TABLET 81 MG: 81 TABLET CHEWABLE at 08:01

## 2023-01-06 RX ADMIN — AMLODIPINE BESYLATE 10 MG: 10 TABLET ORAL at 08:01

## 2023-01-06 RX ADMIN — MUPIROCIN: 20 OINTMENT TOPICAL at 09:01

## 2023-01-06 RX ADMIN — CARVEDILOL 12.5 MG: 12.5 TABLET, FILM COATED ORAL at 08:01

## 2023-01-06 RX ADMIN — INSULIN ASPART 1 UNITS: 100 INJECTION, SOLUTION INTRAVENOUS; SUBCUTANEOUS at 09:01

## 2023-01-06 RX ADMIN — MUPIROCIN: 20 OINTMENT TOPICAL at 08:01

## 2023-01-06 RX ADMIN — HEPARIN SODIUM 5000 UNITS: 5000 INJECTION, SOLUTION INTRAVENOUS; SUBCUTANEOUS at 02:01

## 2023-01-06 RX ADMIN — FUROSEMIDE 40 MG: 10 INJECTION, SOLUTION INTRAMUSCULAR; INTRAVENOUS at 06:01

## 2023-01-06 RX ADMIN — HEPARIN SODIUM 5000 UNITS: 5000 INJECTION, SOLUTION INTRAVENOUS; SUBCUTANEOUS at 06:01

## 2023-01-06 RX ADMIN — CARVEDILOL 12.5 MG: 12.5 TABLET, FILM COATED ORAL at 09:01

## 2023-01-06 RX ADMIN — INSULIN DETEMIR 7 UNITS: 100 INJECTION, SOLUTION SUBCUTANEOUS at 09:01

## 2023-01-06 RX ADMIN — ATORVASTATIN CALCIUM 80 MG: 80 TABLET, FILM COATED ORAL at 08:01

## 2023-01-06 NOTE — PLAN OF CARE
Ss spoke with coby with [facility] and they have denied pt at this time. Ss informed dr vásquez and dr honeycutt. Ss following.

## 2023-01-06 NOTE — PROGRESS NOTES
[facility]  [facility] Medicine  Progress Note    Patient Name: Leanna Harrison  MRN: [id number]  Patient Class: IP- Inpatient   Admission Date: 12/31/2022  Length of Stay: 6 days  Attending Physician: Eleazar Watters MD  Primary Care Provider: Primary Doctor No        Subjective:     Principal Problem:ESRD (end stage renal disease)        HPI:  Chief complaint is ESRD    History of present illness Mr. Almonte is a 72-year-old white gentleman with end-stage renal disease who had been dialyzing on a regular basis in [facility].  Apparently the patient last dialyzed this past Tuesday.  The history is taken from the patient the chart and medical personnel.  The patient apparently was fired from his regular dialysis unit for bring a gun on the premises.  The patient came to the emergency room at the urging of his family.  The patient was visiting his younger brother in the hospital here when he became short of breath.  The patient thinks his shortness of breath may have been precipitated by some indigestion after eating a cinnamon bark on yesterday morning while in the hospital.  He was given couple of doses of Lasix here in the hospital and his breathing is improved greatly.  The patient has over a L of urine in his urinal.  The patient is sitting upright he denies any shortness of breath at this time.  He states his shortness of breath was severe.  It was constant in nature until getting treatment in the emergency room.  The patient states he had a cough about a week or so ago but that improved on its own.  He complains of having some associated chest pain with his shortness of breath.      Overview/Hospital Course:  No notes on file    Subjective & objective note cannot be loaded without a specified hospital service.    01/06/2023   PATIENT SEEN AND EVALUATED TODAY.  PATIENT IS IN NO ACUTE DISTRESS.  PATIENT APPARENTLY WAS BEING DIALYZED [facility] [place] BUT HE WAS RELEASED FROM THE  DIALYSIS CENTER.  PATIENT HAD MISSED DIALYSIS NOW HE IS BACK IN THE HOSPITAL RECEIVING HEMODIALYSIS.  WILL WAITING A PLACE FOR HIM TO DIALYZE PARTICULARLY A NEW PLACE.  TODAY'S AWAKE ALERT HE IS IN NO ACUTE DISTRESS HE DOES COMPLAIN OF DYSPNEA ON EXERTION LUNGS CRACKLES AT HIS BASES CARDIOVASCULAR S1-S2 REGULAR RATE RHYTHM ABDOMEN IS SOFT POSITIVE BOWEL SOUNDS NONTENDER EXTREMITIES TRACE EDEMA NEURO NONFOCAL   PATIENT PRESENTS WITH END-STAGE RENAL DISEASE, HE MISSED DIALYSIS X2 AND HE ALSO PRESENTED WITH DYSPNEA.  WILL CONTINUE DIALYSIS FOR NOW AND DISCHARGE WHEN THERE IS A DIALYSIS CHAIR AVAILABLE.  Assessment/Plan:      Assessment & plan notes cannot be loaded without a specified hospital service.    VTE Risk Mitigation (From admission, onward)           Ordered     heparin (porcine) injection 5,000 Units  Every 8 hours         12/31/22 1737     IP VTE HIGH RISK PATIENT  Once         12/31/22 1737     Place sequential compression device  Until discontinued         12/31/22 1737                    Discharge Planning   DEEPIKA:      Code Status: Full Code   Is the patient medically ready for discharge?:     Reason for patient still in hospital (select all that apply): Treatment  Discharge Plan A: Home with family                  Eleazar Watters MD  Department of Hospital Medicine   [facility]

## 2023-01-06 NOTE — PROGRESS NOTES
[facility] Medicine  Progress Note    Patient Name: Leanna Harrison  MRN: [id number]  Patient Class: IP- Inpatient   Admission Date: 12/31/2022  Length of Stay: 5 days  Attending Physician: Eleazar Singh Jr., MD  Primary Care Provider: Primary Doctor No        Subjective:     Principal Problem:ESRD (end stage renal disease)        HPI:  73 y/o WM with ESRD on HD for about 1 year.  Nephrologist Sven Bernal.  Has missed last 2 HD sessions as he has been dismissed as they found a firearm in his pocket.  Patient states he forgot to leave it in his car.  Regardless patient with some mild SOB today. Currently feels fine and would like to go home but has no where to get HD.   No CP but troponin elevated. We are asked to admit to arrange HD.       Overview/Hospital Course:  No notes on file    Interval History: No complaints. Waiting on outpatient HD approval at new facility.     Review of Systems  Objective:     Vital Signs (Most Recent):  Temp: 98.3 °F (36.8 °C) (01/05/23 1704)  Pulse: 62 (01/05/23 1704)  Resp: 19 (01/05/23 1704)  BP: (!) 156/62 (01/05/23 1704)  SpO2: 96 % (01/05/23 1704)   Vital Signs (24h Range):  Temp:  [98.2 °F (36.8 °C)-98.6 °F (37 °C)] 98.3 °F (36.8 °C)  Pulse:  [60-63] 62  Resp:  [18-19] 19  SpO2:  [96 %-99 %] 96 %  BP: (156-199)/(58-83) 156/62     Weight: 90.7 kg (200 lb)  Body mass index is 27.12 kg/m².    Intake/Output Summary (Last 24 hours) at 1/5/2023 2017  Last data filed at 1/5/2023 0853  Gross per 24 hour   Intake --   Output 375 ml   Net -375 ml      Physical Exam  Vitals reviewed.   Constitutional:       General: He is not in acute distress.     Appearance: He is not toxic-appearing.   Cardiovascular:      Rate and Rhythm: Normal rate and regular rhythm.      Heart sounds: Normal heart sounds.   Pulmonary:      Effort: Pulmonary effort is normal. No respiratory distress.      Breath sounds: Normal breath sounds. No wheezing.   Skin:     General: Skin is  warm and dry.   Neurological:      Mental Status: He is alert.       Significant Labs: All pertinent labs within the past 24 hours have been reviewed.  BMP:   Recent Labs   Lab 01/05/23  0640   *      K 4.1      CO2 24   BUN 34*   CREATININE 4.95*   CALCIUM 9.0       Significant Imaging: I have reviewed all pertinent imaging results/findings within the past 24 hours.      Assessment/Plan:      * ESRD (end stage renal disease)    Currently without an outpatient chair. Has missed 2 sessions, Bun/cr only 48/6.6.  Will have to work on this after the holiday.  No indication for urgent HD tonight. Will use nitropaste to relieve any pulmonary congestion.  Give a dose of lokelma,  K 4.7. Still producing some urine will give lasix 40 bid and place on fluid restriction. Consult nephrology tomorrow.     1/2 - Getting HD.  Home when outpatient chair arranged.     Dyspnea    Likely related to fluid accumulation. See plan for ESRD.  Troponin elevated x 1. Will recheck. No Chest pain.     1/3 - Resolved after HD      VTE Risk Mitigation (From admission, onward)         Ordered     heparin (porcine) injection 5,000 Units  Every 8 hours         12/31/22 1737     IP VTE HIGH RISK PATIENT  Once         12/31/22 1737     Place sequential compression device  Until discontinued         12/31/22 1737                Discharge Planning   DEEPIKA:      Code Status: Full Code   Is the patient medically ready for discharge?:     Reason for patient still in hospital (select all that apply): Treatment  Discharge Plan A: Home with family                  Eleazar Singh Jr, MD  Department of Hospital Medicine   [facility]

## 2023-01-06 NOTE — PLAN OF CARE
Problem: Adult Inpatient Plan of Care  Goal: Plan of Care Review  Outcome: Ongoing, Progressing  Goal: Patient-Specific Goal (Individualized)  Outcome: Ongoing, Progressing  Goal: Absence of Hospital-Acquired Illness or Injury  Outcome: Ongoing, Progressing  Goal: Optimal Comfort and Wellbeing  Outcome: Ongoing, Progressing  Goal: Readiness for Transition of Care  Outcome: Ongoing, Progressing     Problem: Diabetes Comorbidity  Goal: Blood Glucose Level Within Targeted Range  Outcome: Ongoing, Progressing     Problem: Device-Related Complication Risk (Hemodialysis)  Goal: Safe, Effective Therapy Delivery  Outcome: Ongoing, Progressing     Problem: Hemodynamic Instability (Hemodialysis)  Goal: Effective Tissue Perfusion  Outcome: Ongoing, Progressing     Problem: Infection (Hemodialysis)  Goal: Absence of Infection Signs and Symptoms  Outcome: Ongoing, Progressing

## 2023-01-06 NOTE — DIALYSIS ROUNDING
Nephrology Department            NAME: Leanna Harrison   YOB: 1950  MRN: [id number]  NOTE DATE: 01/06/2023       Seen in Dialysis Unit.    Patient complains:  None.      REVIEW OF SYSTEMS:  he reports no shortness of breath, nausea or vomiting. No acute changes.    VITALS:  height is 6' (1.829 m) and weight is 90.7 kg (200 lb). His temperature is 98 °F (36.7 °C). His blood pressure is 165/80 (abnormal) and his pulse is 64. His respiration is 18 and oxygen saturation is 98%.  Hemodialysis documentation flowsheet reviewed with dialysis nurse, including weight and vitals.    Resting comfortably, NAD.  Respiration unlabored. Lungs clear.  Heart regular, no rub.  Abdomen soft, nontender, positive bowl sounds  No edema.    Recent Labs   Lab 01/04/23  0417 01/05/23  0640 01/06/23  0408    140 140   K 3.8 4.1 4.0    104 105   CO2 23 24 22   BUN 46* 34* 52*   CREATININE 5.96* 4.95* 6.02*   CALCIUM 8.5 9.0 8.9   PHOS 4.6* 4.2 4.5     Recent Labs   Lab 01/04/23  0417 01/05/23  1009 01/06/23  0653   WBC 8.34 8.49 10.56   HGB 9.1* 10.3* 10.3*   HCT 28.8* 32.5* 33.1*    255 277       ASSESSMENT/PLAN:  Continue with scheduled hemodialysis for this patient.    Miguel Ángel Ureña Jr, MD

## 2023-01-06 NOTE — SUBJECTIVE & OBJECTIVE
Interval History: No complaints. Waiting on outpatient HD approval at [facility] facility.     Review of Systems  Objective:     Vital Signs (Most Recent):  Temp: 98.3 °F (36.8 °C) (01/05/23 1704)  Pulse: 62 (01/05/23 1704)  Resp: 19 (01/05/23 1704)  BP: (!) 156/62 (01/05/23 1704)  SpO2: 96 % (01/05/23 1704)   Vital Signs (24h Range):  Temp:  [98.2 °F (36.8 °C)-98.6 °F (37 °C)] 98.3 °F (36.8 °C)  Pulse:  [60-63] 62  Resp:  [18-19] 19  SpO2:  [96 %-99 %] 96 %  BP: (156-199)/(58-83) 156/62     Weight: 90.7 kg (200 lb)  Body mass index is 27.12 kg/m².    Intake/Output Summary (Last 24 hours) at 1/5/2023 2017  Last data filed at 1/5/2023 0853  Gross per 24 hour   Intake --   Output 375 ml   Net -375 ml      Physical Exam  Vitals reviewed.   Constitutional:       General: He is not in acute distress.     Appearance: He is not toxic-appearing.   Cardiovascular:      Rate and Rhythm: Normal rate and regular rhythm.      Heart sounds: Normal heart sounds.   Pulmonary:      Effort: Pulmonary effort is normal. No respiratory distress.      Breath sounds: Normal breath sounds. No wheezing.   Skin:     General: Skin is warm and dry.   Neurological:      Mental Status: He is alert.       Significant Labs: All pertinent labs within the past 24 hours have been reviewed.  BMP:   Recent Labs   Lab 01/05/23  0640   *      K 4.1      CO2 24   BUN 34*   CREATININE 4.95*   CALCIUM 9.0       Significant Imaging: I have reviewed all pertinent imaging results/findings within the past 24 hours.

## 2023-01-06 NOTE — PROGRESS NOTES
Patient denies n/v, [facility] pulm denies sob    Physical exam general the patient is in no acute distress, no edema    Assessment/plan 1.  ESRD-will continue 3 times weekly dialysis, cont. To work for hd placement.  2.  DM  3.  HTN  - SBP -159, cont. amlodipine  4. SOB-this patient's breathing is doing better  5.  Dyslipidemia  6.  Anemia cont. epo

## 2023-01-07 LAB
ALBUMIN SERPL BCP-MCNC: 2.8 G/DL (ref 3.5–5)
ANION GAP SERPL CALCULATED.3IONS-SCNC: 19 MMOL/L (ref 7–16)
BASOPHILS # BLD AUTO: 0.06 K/UL (ref 0–0.2)
BASOPHILS NFR BLD AUTO: 0.5 % (ref 0–1)
BUN SERPL-MCNC: 31 MG/DL (ref 7–18)
BUN/CREAT SERPL: 7 (ref 6–20)
CALCIUM SERPL-MCNC: 9.3 MG/DL (ref 8.5–10.1)
CHLORIDE SERPL-SCNC: 103 MMOL/L (ref 98–107)
CO2 SERPL-SCNC: 23 MMOL/L (ref 21–32)
CREAT SERPL-MCNC: 4.54 MG/DL (ref 0.7–1.3)
DIFFERENTIAL METHOD BLD: ABNORMAL
EGFR (NO RACE VARIABLE) (RUSH/TITUS): 13 ML/MIN/1.73M²
EOSINOPHIL # BLD AUTO: 0.4 K/UL (ref 0–0.5)
EOSINOPHIL NFR BLD AUTO: 3.4 % (ref 1–4)
ERYTHROCYTE [DISTWIDTH] IN BLOOD BY AUTOMATED COUNT: 14.2 % (ref 11.5–14.5)
GLUCOSE SERPL-MCNC: 170 MG/DL (ref 74–106)
GLUCOSE SERPL-MCNC: 207 MG/DL (ref 70–105)
GLUCOSE SERPL-MCNC: 211 MG/DL (ref 70–105)
GLUCOSE SERPL-MCNC: 313 MG/DL (ref 70–105)
GLUCOSE SERPL-MCNC: 325 MG/DL (ref 70–105)
GLUCOSE SERPL-MCNC: 355 MG/DL (ref 70–105)
HCT VFR BLD AUTO: 31.2 % (ref 40–54)
HGB BLD-MCNC: 9.7 G/DL (ref 13.5–18)
IMM GRANULOCYTES # BLD AUTO: 0.07 K/UL (ref 0–0.04)
IMM GRANULOCYTES NFR BLD: 0.6 % (ref 0–0.4)
LYMPHOCYTES # BLD AUTO: 3.22 K/UL (ref 1–4.8)
LYMPHOCYTES NFR BLD AUTO: 27.5 % (ref 27–41)
MCH RBC QN AUTO: 27.5 PG (ref 27–31)
MCHC RBC AUTO-ENTMCNC: 31.1 G/DL (ref 32–36)
MCV RBC AUTO: 88.4 FL (ref 80–96)
MONOCYTES # BLD AUTO: 1 K/UL (ref 0–0.8)
MONOCYTES NFR BLD AUTO: 8.5 % (ref 2–6)
MPC BLD CALC-MCNC: 11.8 FL (ref 9.4–12.4)
NEUTROPHILS # BLD AUTO: 6.97 K/UL (ref 1.8–7.7)
NEUTROPHILS NFR BLD AUTO: 59.5 % (ref 53–65)
NRBC # BLD AUTO: 0 X10E3/UL
NRBC, AUTO (.00): 0 %
PHOSPHATE SERPL-MCNC: 3.4 MG/DL (ref 2.5–4.5)
PLATELET # BLD AUTO: 332 K/UL (ref 150–400)
POTASSIUM SERPL-SCNC: 3.9 MMOL/L (ref 3.5–5.1)
RBC # BLD AUTO: 3.53 M/UL (ref 4.6–6.2)
SODIUM SERPL-SCNC: 141 MMOL/L (ref 136–145)
WBC # BLD AUTO: 11.72 K/UL (ref 4.5–11)

## 2023-01-07 PROCEDURE — 96372 THER/PROPH/DIAG INJ SC/IM: CPT | Performed by: FAMILY MEDICINE

## 2023-01-07 PROCEDURE — 36415 COLL VENOUS BLD VENIPUNCTURE: CPT | Performed by: FAMILY MEDICINE

## 2023-01-07 PROCEDURE — G0378 HOSPITAL OBSERVATION PER HR: HCPCS

## 2023-01-07 PROCEDURE — 85025 COMPLETE CBC W/AUTO DIFF WBC: CPT | Performed by: FAMILY MEDICINE

## 2023-01-07 PROCEDURE — 96376 TX/PRO/DX INJ SAME DRUG ADON: CPT

## 2023-01-07 PROCEDURE — 25000003 PHARM REV CODE 250: Performed by: FAMILY MEDICINE

## 2023-01-07 PROCEDURE — 99232 SBSQ HOSP IP/OBS MODERATE 35: CPT | Mod: ,,, | Performed by: INTERNAL MEDICINE

## 2023-01-07 PROCEDURE — 82962 GLUCOSE BLOOD TEST: CPT

## 2023-01-07 PROCEDURE — 11000001 HC ACUTE MED/SURG PRIVATE ROOM

## 2023-01-07 PROCEDURE — 63600175 PHARM REV CODE 636 W HCPCS: Performed by: FAMILY MEDICINE

## 2023-01-07 PROCEDURE — 80069 RENAL FUNCTION PANEL: CPT | Performed by: FAMILY MEDICINE

## 2023-01-07 PROCEDURE — 99232 PR SUBSEQUENT HOSPITAL CARE,LEVL II: ICD-10-PCS | Mod: ,,, | Performed by: INTERNAL MEDICINE

## 2023-01-07 RX ADMIN — HEPARIN SODIUM 5000 UNITS: 5000 INJECTION, SOLUTION INTRAVENOUS; SUBCUTANEOUS at 01:01

## 2023-01-07 RX ADMIN — CARVEDILOL 12.5 MG: 12.5 TABLET, FILM COATED ORAL at 09:01

## 2023-01-07 RX ADMIN — NITROGLYCERIN 0.5 INCH: 20 OINTMENT TOPICAL at 06:01

## 2023-01-07 RX ADMIN — ASPIRIN 81 MG CHEWABLE TABLET 81 MG: 81 TABLET CHEWABLE at 09:01

## 2023-01-07 RX ADMIN — ATORVASTATIN CALCIUM 80 MG: 80 TABLET, FILM COATED ORAL at 09:01

## 2023-01-07 RX ADMIN — HEPARIN SODIUM 5000 UNITS: 5000 INJECTION, SOLUTION INTRAVENOUS; SUBCUTANEOUS at 09:01

## 2023-01-07 RX ADMIN — NITROGLYCERIN 0.5 INCH: 20 OINTMENT TOPICAL at 11:01

## 2023-01-07 RX ADMIN — INSULIN DETEMIR 7 UNITS: 100 INJECTION, SOLUTION SUBCUTANEOUS at 09:01

## 2023-01-07 RX ADMIN — AMLODIPINE BESYLATE 10 MG: 10 TABLET ORAL at 09:01

## 2023-01-07 RX ADMIN — INSULIN ASPART 4 UNITS: 100 INJECTION, SOLUTION INTRAVENOUS; SUBCUTANEOUS at 06:01

## 2023-01-07 RX ADMIN — FUROSEMIDE 40 MG: 10 INJECTION, SOLUTION INTRAMUSCULAR; INTRAVENOUS at 06:01

## 2023-01-07 RX ADMIN — HEPARIN SODIUM 5000 UNITS: 5000 INJECTION, SOLUTION INTRAVENOUS; SUBCUTANEOUS at 06:01

## 2023-01-07 RX ADMIN — CLOPIDOGREL BISULFATE 75 MG: 75 TABLET, FILM COATED ORAL at 09:01

## 2023-01-07 RX ADMIN — NITROGLYCERIN 0.5 INCH: 20 OINTMENT TOPICAL at 01:01

## 2023-01-07 RX ADMIN — INSULIN ASPART 2 UNITS: 100 INJECTION, SOLUTION INTRAVENOUS; SUBCUTANEOUS at 06:01

## 2023-01-07 RX ADMIN — INSULIN ASPART 5 UNITS: 100 INJECTION, SOLUTION INTRAVENOUS; SUBCUTANEOUS at 01:01

## 2023-01-07 NOTE — PROGRESS NOTES
[facility] Medicine  Progress Note    Patient Name: Leanna Harrison  MRN: [id number]  Patient Class: IP- Inpatient   Admission Date: 12/31/2022  Length of Stay: 7 days  Attending Physician: Eleazar Watters MD  Primary Care Provider: Primary Doctor No        Subjective:     Principal Problem:ESRD (end stage renal disease)        HPI:  71 y/o WM with ESRD on HD for about 1 year.  Nephrologist Sven Bernal.  Has missed last 2 HD sessions as he has been dismissed as they found a firearm in his pocket.  Patient states he forgot to leave it in his car.  Regardless patient with some mild SOB today. Currently feels fine and would like to go home but has no where to get HD.   No CP but troponin elevated. We are asked to admit to arrange HD.       Overview/Hospital Course:  No notes on file    No new subjective & objective note has been filed under this hospital service since the last note was generated.    01/07/2023   PATIENT SEEN AND EVALUATED TODAY.  PATIENT IS IN NO ACUTE DISTRESS.  PATIENT APPARENTLY WAS BEING DIALYZED in [place] BUT HE WAS RELEASED FROM THE DIALYSIS CENTER.  PATIENT HAD MISSED DIALYSIS NOW HE IS BACK IN THE HOSPITAL RECEIVING HEMODIALYSIS.  WILL WAITING A PLACE FOR HIM TO DIALYZE PARTICULARLY A NEW PLACE.  TODAY'S AWAKE ALERT HE IS IN NO ACUTE DISTRESS HE DOES COMPLAIN OF DYSPNEA ON EXERTION LUNGS CRACKLES AT HIS BASES CARDIOVASCULAR S1-S2 REGULAR RATE RHYTHM ABDOMEN IS SOFT POSITIVE BOWEL SOUNDS NONTENDER EXTREMITIES TRACE EDEMA NEURO NONFOCAL   PATIENT PRESENTS WITH END-STAGE RENAL DISEASE, HE MISSED DIALYSIS X2 AND HE ALSO PRESENTED WITH DYSPNEA.  WILL CONTINUE DIALYSIS FOR NOW AND DISCHARGE WHEN THERE IS A DIALYSIS CHAIR AVAILABLE.  Continue inpatient hemodialysis for now  Assessment/Plan:      * ESRD (end stage renal disease)    Currently without an outpatient chair. Has missed 2 sessions, Bun/cr only 48/6.6.  Will have to work on this after the holiday.  No  indication for urgent HD tonight. Will use nitropaste to relieve any pulmonary congestion.  Give a dose of lokelma,  K 4.7. Still producing some urine will give lasix 40 bid and place on fluid restriction. Consult nephrology tomorrow.     1/2 - Getting HD.  Home when outpatient chair arranged.     Dyspnea    Likely related to fluid accumulation. See plan for ESRD.  Troponin elevated x 1. Will recheck. No Chest pain.     1/3 - Resolved after HD      VTE Risk Mitigation (From admission, onward)           Ordered     heparin (porcine) injection 5,000 Units  Every 8 hours         12/31/22 1737     IP VTE HIGH RISK PATIENT  Once         12/31/22 1737     Place sequential compression device  Until discontinued         12/31/22 1737                    Discharge Planning   DEEPIKA:      Code Status: Full Code   Is the patient medically ready for discharge?:     Reason for patient still in hospital (select all that apply): Treatment  Discharge Plan A: Home with family                  Eleazar Watters MD  Department of Hospital Medicine   [facility]

## 2023-01-07 NOTE — PLAN OF CARE
Problem: Adult Inpatient Plan of Care  Goal: Plan of Care Review  Outcome: Ongoing, Progressing  Flowsheets (Taken 1/7/2023 0158)  Plan of Care Reviewed With: patient  Goal: Patient-Specific Goal (Individualized)  Outcome: Ongoing, Progressing  Goal: Absence of Hospital-Acquired Illness or Injury  Outcome: Ongoing, Progressing  Intervention: Identify and Manage Fall Risk  Flowsheets (Taken 1/7/2023 0158)  Safety Promotion/Fall Prevention: assistive device/personal item within reach  Intervention: Prevent Skin Injury  Flowsheets (Taken 1/7/2023 0158)  Body Position: position changed independently  Skin Protection: adhesive use limited  Intervention: Prevent Infection  Flowsheets (Taken 1/7/2023 0158)  Infection Prevention:   hand hygiene promoted   rest/sleep promoted  Goal: Optimal Comfort and Wellbeing  Outcome: Ongoing, Progressing  Intervention: Monitor Pain and Promote Comfort  Flowsheets (Taken 1/7/2023 0158)  Pain Management Interventions:   care clustered   pain management plan reviewed with patient/caregiver  Intervention: Provide Person-Centered Care  Flowsheets (Taken 1/7/2023 0158)  Trust Relationship/Rapport: care explained  Goal: Readiness for Transition of Care  Outcome: Ongoing, Progressing     Problem: Diabetes Comorbidity  Goal: Blood Glucose Level Within Targeted Range  Outcome: Ongoing, Progressing     Problem: Device-Related Complication Risk (Hemodialysis)  Goal: Safe, Effective Therapy Delivery  Outcome: Ongoing, Progressing     Problem: Hemodynamic Instability (Hemodialysis)  Goal: Effective Tissue Perfusion  Outcome: Ongoing, Progressing     Problem: Infection (Hemodialysis)  Goal: Absence of Infection Signs and Symptoms  Outcome: Ongoing, Progressing

## 2023-01-07 NOTE — PROGRESS NOTES
[facility]  Adult Nutrition  First Assessment Note         Reason for Assessment  Reason For Assessment: length of stay   Nutrition Risk Screen: no indicators present    Assessment and Plan  71 y/o WM with ESRD on HD for about 1 year.  Nephrologist Sven Bernal.  Has missed last 2 HD sessions as he has been dismissed as they found a firearm in his pocket.  Patient states he forgot to leave it in his car.  Regardless patient with some mild SOB today. Currently feels fine and would like to go home but has no where to get HD.   No CP but troponin elevated. We are asked to admit to arrange HD.     RD assessment of pt due to LOS. Pt doing well on renal diet, eating 100% of meals. Weight stable per flowsheets, though last weight obtained was on 1/1. Recommend obtaining current weight for monitoring.     Recommend continue renal diet and recommend consider addition of diabetic diet to current diet order given hyperglycemia. Pt eating well, % of meals. Could consider addition of ProSource to pt regimen given low albumin. Encourage po intakes.    Last BM 1/6 per flowsheets. Monitor labs, meds, weights, po intakes, updates in pt condition.     Malnutrition  Is Patient Malnourished: No  Skin Integrity  Vasyl Risk Assessment  Sensory Perception: 4-->no impairment  Moisture: 4-->rarely moist  Activity: 3-->walks occasionally  Mobility: 3-->slightly limited  Nutrition: 3-->adequate  Friction and Shear: 3-->no apparent problem  Vasyl Score: 20    Nutrition Diagnosis  Altered Nutrition Related Lab Values   related to Diabetes Mellitus as evidenced by elevated GLU    Nutrition Diagnosis Status: Chronic/ continues    Nutrition Risk  Level of Risk/Frequency of Follow-up: moderate    Recent Labs   Lab 01/07/23  0417 01/07/23  0622 01/07/23  1132   *  --   --    POCGLU  --    < > 355*    < > = values in this interval not displayed.     Comments on Glucose: elevated, hx of DM  Nutrition Prescription  / Recommendations  Recommendation/Intervention: Recommend continue renal diet and recommend consider addition of diabetic diet to current diet order given hyperglycemia. Pt eating well, % of meals. Could consider addition of ProSource to pt regimen given low albumin. Encourage po intakes.  Goals: continue adequate oral intakes % of meals, weight stability.  Nutrition Goal Status: new  Current Diet Order: Renal diet  Chewing or Swallowing Difficulty?: No Chewing or swallowing difficulty  Recommended Diet: Renal (High Protein) recommend consider addition of diabetic diet to current diet order given hyperglycemia   Recommended Oral Supplement: ProSource No Carb Liquid Protein [60 kcals, 15g Protein] twice daily    Is Nutrition Support Recommended: No  Is Education Recommended: No  Monitor and Evaluation  % current Intake: P.O. intake of 75 - 100 %  % intake to meet estimated needs: 75 - 100 %  Food and Nutrient Intake: energy intake  Food and Nutrient Adminstration: diet order  Anthropometric Measurements: weight, weight change  Biochemical Data, Medical Tests and Procedures: electrolyte and renal panel, gastrointestinal profile, glucose/endocrine profile, inflammatory profile, lipid profile  Nutrition-Focused Physical Findings: overall appearance     Current Medical Diagnosis and Past Medical History  Diagnosis: renal disease (ESRD)  Past Medical History:   Diagnosis Date    Diabetes mellitus     Hypertension     Renal failure     Stroke      Nutrition/Diet History     Lab/Procedures/Meds  Recent Labs   Lab 01/07/23  0417      K 3.9   BUN 31*   CREATININE 4.54*   CALCIUM 9.3   ALBUMIN 2.8*      PHOS 3.4     Last A1c: No results found for: HGBA1C  Lab Results   Component Value Date    RBC 3.53 (L) 01/07/2023    HGB 9.7 (L) 01/07/2023    HCT 31.2 (L) 01/07/2023    MCV 88.4 01/07/2023    MCH 27.5 01/07/2023    MCHC 31.1 (L) 01/07/2023     Pertinent Labs Reviewed: reviewed  Pertinent Labs Comments:  Anion gap 19(H)-unsure etiology; BUN 31(H), Creat 4.54(H), GFR 13(L)-ESRD; Alb 2.8(L)-likely r/t ESRD; (H)-hx DM  Pertinent Medications Reviewed: reviewed  Pertinent Medications Comments: amlodipine, aspirin, atorvastatin, carvedilol, clopidogrel, epoetin-molina-epbx injection, heparin, insulin  Anthropometrics  Temp: 99.3 °F (37.4 °C)  Height Method: Stated  Height: 6' (182.9 cm)  Height (inches): 72 in  Weight Method: Standard Scale  Weight: 90.7 kg (199 lb 15.3 oz)  Weight (lb): 199.96 lb  Ideal Body Weight (IBW), Male: 178 lb  % Ideal Body Weight, Male (lb): 112.34 %  BMI (Calculated): 27.1     Estimated/Assessed Needs  Weight Used For Calorie Calculations: 90.7 kg (199 lb 15.3 oz)   Energy Need Method: Kcal/kg Energy Calorie Requirements (kcal): 6799-9227 kcal (25-30 kcal/kg)  Weight Used For Protein Calculations: 90.7 kg (199 lb 15.3 oz)  Protein Requirements:  g PRO (1.1-1.3 g PRO/kg-increased PRO needs with ESRD on HD)       RDA Method (mL): 2267     Nutrition by Nursing  Diet/Nutrition Received: other (see comments) (renal diet)  Intake (%): 100%        Last Bowel Movement: 01/06/23              Nutrition Follow-Up  RD Follow-up?: Yes

## 2023-01-08 VITALS
BODY MASS INDEX: 27.08 KG/M2 | DIASTOLIC BLOOD PRESSURE: 71 MMHG | TEMPERATURE: 98 F | HEART RATE: 67 BPM | OXYGEN SATURATION: 97 % | RESPIRATION RATE: 14 BRPM | SYSTOLIC BLOOD PRESSURE: 155 MMHG | WEIGHT: 199.94 LBS | HEIGHT: 72 IN

## 2023-01-08 LAB
ALBUMIN SERPL BCP-MCNC: 2.8 G/DL (ref 3.5–5)
ANION GAP SERPL CALCULATED.3IONS-SCNC: 15 MMOL/L (ref 7–16)
BASOPHILS # BLD AUTO: 0.05 K/UL (ref 0–0.2)
BASOPHILS NFR BLD AUTO: 0.5 % (ref 0–1)
BUN SERPL-MCNC: 51 MG/DL (ref 7–18)
BUN/CREAT SERPL: 8 (ref 6–20)
CALCIUM SERPL-MCNC: 9.2 MG/DL (ref 8.5–10.1)
CHLORIDE SERPL-SCNC: 102 MMOL/L (ref 98–107)
CO2 SERPL-SCNC: 25 MMOL/L (ref 21–32)
CREAT SERPL-MCNC: 6.14 MG/DL (ref 0.7–1.3)
DIFFERENTIAL METHOD BLD: ABNORMAL
EGFR (NO RACE VARIABLE) (RUSH/TITUS): 9 ML/MIN/1.73M²
EOSINOPHIL # BLD AUTO: 0.43 K/UL (ref 0–0.5)
EOSINOPHIL NFR BLD AUTO: 4.3 % (ref 1–4)
ERYTHROCYTE [DISTWIDTH] IN BLOOD BY AUTOMATED COUNT: 14.6 % (ref 11.5–14.5)
GLUCOSE SERPL-MCNC: 259 MG/DL (ref 70–105)
GLUCOSE SERPL-MCNC: 291 MG/DL (ref 70–105)
GLUCOSE SERPL-MCNC: 317 MG/DL (ref 74–106)
GLUCOSE SERPL-MCNC: 341 MG/DL (ref 70–105)
HCT VFR BLD AUTO: 31 % (ref 40–54)
HGB BLD-MCNC: 9.5 G/DL (ref 13.5–18)
IMM GRANULOCYTES # BLD AUTO: 0.03 K/UL (ref 0–0.04)
IMM GRANULOCYTES NFR BLD: 0.3 % (ref 0–0.4)
LYMPHOCYTES # BLD AUTO: 2.89 K/UL (ref 1–4.8)
LYMPHOCYTES NFR BLD AUTO: 29.1 % (ref 27–41)
MCH RBC QN AUTO: 27.4 PG (ref 27–31)
MCHC RBC AUTO-ENTMCNC: 30.6 G/DL (ref 32–36)
MCV RBC AUTO: 89.3 FL (ref 80–96)
MONOCYTES # BLD AUTO: 0.88 K/UL (ref 0–0.8)
MONOCYTES NFR BLD AUTO: 8.9 % (ref 2–6)
MPC BLD CALC-MCNC: 12 FL (ref 9.4–12.4)
NEUTROPHILS # BLD AUTO: 5.66 K/UL (ref 1.8–7.7)
NEUTROPHILS NFR BLD AUTO: 56.9 % (ref 53–65)
NRBC # BLD AUTO: 0 X10E3/UL
NRBC, AUTO (.00): 0 %
PHOSPHATE SERPL-MCNC: 4.1 MG/DL (ref 2.5–4.5)
PLATELET # BLD AUTO: 341 K/UL (ref 150–400)
POTASSIUM SERPL-SCNC: 4 MMOL/L (ref 3.5–5.1)
RBC # BLD AUTO: 3.47 M/UL (ref 4.6–6.2)
SODIUM SERPL-SCNC: 138 MMOL/L (ref 136–145)
WBC # BLD AUTO: 9.94 K/UL (ref 4.5–11)

## 2023-01-08 PROCEDURE — 25000003 PHARM REV CODE 250: Performed by: FAMILY MEDICINE

## 2023-01-08 PROCEDURE — 85025 COMPLETE CBC W/AUTO DIFF WBC: CPT | Performed by: FAMILY MEDICINE

## 2023-01-08 PROCEDURE — G0378 HOSPITAL OBSERVATION PER HR: HCPCS

## 2023-01-08 PROCEDURE — 36415 COLL VENOUS BLD VENIPUNCTURE: CPT | Performed by: FAMILY MEDICINE

## 2023-01-08 PROCEDURE — 63600175 PHARM REV CODE 636 W HCPCS: Mod: GZ | Performed by: FAMILY MEDICINE

## 2023-01-08 PROCEDURE — 82962 GLUCOSE BLOOD TEST: CPT

## 2023-01-08 PROCEDURE — 96376 TX/PRO/DX INJ SAME DRUG ADON: CPT

## 2023-01-08 PROCEDURE — 80069 RENAL FUNCTION PANEL: CPT | Performed by: FAMILY MEDICINE

## 2023-01-08 PROCEDURE — 96372 THER/PROPH/DIAG INJ SC/IM: CPT | Performed by: FAMILY MEDICINE

## 2023-01-08 RX ORDER — AMLODIPINE BESYLATE 10 MG/1
10 TABLET ORAL DAILY
Qty: 30 TABLET | Refills: 11
Start: 2023-01-09 | End: 2024-01-09

## 2023-01-08 RX ORDER — NAPROXEN SODIUM 220 MG/1
81 TABLET, FILM COATED ORAL DAILY
Refills: 0
Start: 2023-01-09 | End: 2024-01-09

## 2023-01-08 RX ORDER — ATORVASTATIN CALCIUM 80 MG/1
80 TABLET, FILM COATED ORAL DAILY
Qty: 90 TABLET | Refills: 3
Start: 2023-01-09 | End: 2024-01-09

## 2023-01-08 RX ORDER — CLOPIDOGREL BISULFATE 75 MG/1
75 TABLET ORAL DAILY
Qty: 30 TABLET | Refills: 11
Start: 2023-01-09 | End: 2024-01-09

## 2023-01-08 RX ORDER — CARVEDILOL 12.5 MG/1
12.5 TABLET ORAL 2 TIMES DAILY
Qty: 60 TABLET | Refills: 11
Start: 2023-01-08 | End: 2024-01-08

## 2023-01-08 RX ORDER — CARVEDILOL 25 MG/1
25 TABLET ORAL 2 TIMES DAILY
Status: DISCONTINUED | OUTPATIENT
Start: 2023-01-08 | End: 2023-01-08 | Stop reason: HOSPADM

## 2023-01-08 RX ADMIN — HEPARIN SODIUM 5000 UNITS: 5000 INJECTION, SOLUTION INTRAVENOUS; SUBCUTANEOUS at 06:01

## 2023-01-08 RX ADMIN — ASPIRIN 81 MG CHEWABLE TABLET 81 MG: 81 TABLET CHEWABLE at 09:01

## 2023-01-08 RX ADMIN — CLOPIDOGREL BISULFATE 75 MG: 75 TABLET, FILM COATED ORAL at 09:01

## 2023-01-08 RX ADMIN — HEPARIN SODIUM 5000 UNITS: 5000 INJECTION, SOLUTION INTRAVENOUS; SUBCUTANEOUS at 01:01

## 2023-01-08 RX ADMIN — INSULIN ASPART 3 UNITS: 100 INJECTION, SOLUTION INTRAVENOUS; SUBCUTANEOUS at 09:01

## 2023-01-08 RX ADMIN — ATORVASTATIN CALCIUM 80 MG: 80 TABLET, FILM COATED ORAL at 09:01

## 2023-01-08 RX ADMIN — NITROGLYCERIN 0.5 INCH: 20 OINTMENT TOPICAL at 06:01

## 2023-01-08 RX ADMIN — INSULIN ASPART 4 UNITS: 100 INJECTION, SOLUTION INTRAVENOUS; SUBCUTANEOUS at 11:01

## 2023-01-08 RX ADMIN — FUROSEMIDE 40 MG: 10 INJECTION, SOLUTION INTRAMUSCULAR; INTRAVENOUS at 06:01

## 2023-01-08 RX ADMIN — NITROGLYCERIN 0.5 INCH: 20 OINTMENT TOPICAL at 11:01

## 2023-01-08 RX ADMIN — INSULIN ASPART 3 UNITS: 100 INJECTION, SOLUTION INTRAVENOUS; SUBCUTANEOUS at 03:01

## 2023-01-08 RX ADMIN — AMLODIPINE BESYLATE 10 MG: 10 TABLET ORAL at 09:01

## 2023-01-08 RX ADMIN — CARVEDILOL 12.5 MG: 12.5 TABLET, FILM COATED ORAL at 09:01

## 2023-01-08 NOTE — NURSING
Notified by JUAN LUIS Valerio that patient has discharge orders, but glucose needs to be below 200 before discharge. Glucose rechecked, resulted at 291. Was covered according to [employer]. Iman was notified and states that she made adjustments to his insulin and he was ok for discharge. Patient brother notified that patient is ready for discharge. States he will come  patient.     1604-Unsure where patient new medication was called in. As instructions does not state where new scripts can be picked up. Secure chat sent to JUAN LUIS Valerio. Awaiting response.     1656-Spoke with Dr. Watters, states that it is ok to discharge patient and meds can be fixed in the morning if needed.     1731-Discharge instructions reviewed with patient and patient's brother. Brother understands that if there is a medication that they do not have to call back to hospital and ask for the 4th floor and to let them know. Because Iman state that she will call it in if needed.  All questions and concerns addressed. PIV removed. Patient and brother understands Dialysis regimen.

## 2023-01-08 NOTE — PLAN OF CARE
Problem: Adult Inpatient Plan of Care  Goal: Plan of Care Review  Outcome: Ongoing, Progressing  Flowsheets (Taken 1/8/2023 0745)  Plan of Care Reviewed With: patient     Problem: Diabetes Comorbidity  Goal: Blood Glucose Level Within Targeted Range  Outcome: Ongoing, Progressing  Intervention: Monitor and Manage Glycemia  Flowsheets (Taken 1/8/2023 0745)  Glycemic Management:   blood glucose monitored   supplemental insulin given

## 2023-01-08 NOTE — PLAN OF CARE
Problem: Adult Inpatient Plan of Care  Goal: Plan of Care Review  Outcome: Met  Goal: Patient-Specific Goal (Individualized)  Outcome: Met  Goal: Absence of Hospital-Acquired Illness or Injury  Outcome: Met  Goal: Optimal Comfort and Wellbeing  Outcome: Met  Goal: Readiness for Transition of Care  Outcome: Met     Problem: Diabetes Comorbidity  Goal: Blood Glucose Level Within Targeted Range  Outcome: Met     Problem: Device-Related Complication Risk (Hemodialysis)  Goal: Safe, Effective Therapy Delivery  Outcome: Met     Problem: Hemodynamic Instability (Hemodialysis)  Goal: Effective Tissue Perfusion  Outcome: Met     Problem: Infection (Hemodialysis)  Goal: Absence of Infection Signs and Symptoms  Outcome: Met

## 2023-01-08 NOTE — PROGRESS NOTES
[facility]  Nephrology  Progress Note    Patient Name: Leanna Harrison  MRN: [id number]  Admission Date: 12/31/2022  Hospital Length of Stay: 8 days  Attending Provider: Eleazar Watters MD   Primary Care Physician: Primary Doctor No  Principal Problem:ESRD (end stage renal disease)    Consults  Subjective:     Interval History: The patient has no complaints today    Review of patient's allergies indicates:  No Known Allergies  Current Facility-Administered Medications   Medication Frequency    0.9%  NaCl infusion PRN    amLODIPine tablet 10 mg Daily    aspirin chewable tablet 81 mg Daily    atorvastatin tablet 80 mg Daily    carvediloL tablet 12.5 mg BID    clopidogreL tablet 75 mg Daily    dextrose 10% bolus 125 mL 125 mL PRN    dextrose 10% bolus 250 mL 250 mL PRN    epoetin molina-epbx injection 10,000 Units Every Mon, Wed, Fri    furosemide injection 40 mg Q12H    glucagon (human recombinant) injection 1 mg PRN    heparin (porcine) injection 5,000 Units Q8H    insulin aspart U-100 injection 0-5 Units QID (AC + HS) PRN    insulin detemir U-100 injection 7 Units QHS    naloxone 0.4 mg/mL injection 0.02 mg PRN    nitroGLYCERIN 2% TD oint ointment 0.5 inch Q6H    sodium chloride 0.9% flush 10 mL Q12H PRN       Objective:     Vital Signs (Most Recent):  Temp: 97.9 °F (36.6 °C) (01/08/23 0000)  Pulse: 70 (01/08/23 0000)  Resp: 18 (01/08/23 0000)  BP: (!) 161/68 (01/08/23 0000)  SpO2: 97 % (01/08/23 0000)   Vital Signs (24h Range):  Temp:  [97.9 °F (36.6 °C)-99.3 °F (37.4 °C)] 97.9 °F (36.6 °C)  Pulse:  [] 70  Resp:  [18-20] 18  SpO2:  [93 %-98 %] 97 %  BP: (133-161)/(62-76) 161/68     Weight: 90.7 kg (199 lb 15.3 oz) (01/07/23 1336)  Body mass index is 27.12 kg/m².  Body surface area is 2.15 meters squared.    I/O last 3 completed shifts:  In: 0   Out: 2500 [Other:2500]    Physical Exam  Lungs-clear  Cor-1/6 systolic murmur no rub  Abd-soft    Significant Labs:sureCBC:   Recent Labs   Lab  01/07/23 0417   WBC 11.72*   RBC 3.53*   HGB 9.7*   HCT 31.2*      MCV 88.4   MCH 27.5   MCHC 31.1*     CMP:   Recent Labs   Lab 01/07/23 0417   *   CALCIUM 9.3   ALBUMIN 2.8*      K 3.9   CO2 23      BUN 31*   CREATININE 4.54*     All labs within the past 24 hours have been reviewed.    Significant Imaging:      Assessment/Plan:     Active Diagnoses:    Diagnosis Date Noted POA    PRINCIPAL PROBLEM:  ESRD (end stage renal disease) [N18.6] 12/31/2022 Yes    Anemia [D64.9] 01/01/2023 Yes    HTN (hypertension) [I10] 01/01/2023 Yes    Diabetes mellitus [E11.9] 01/01/2023 Unknown    Dyspnea [R06.00] 12/31/2022 Yes      Problems Resolved During this Admission:       Plan:  Continue the present care    Thank you for your consult. I will follow-up with patient. Please contact us if you have any additional questions.    Josemanuel Love MD  Nephrology  [facility]

## 2023-01-08 NOTE — ASSESSMENT & PLAN NOTE
Patient's FSGs are uncontrolled due to hyperglycemia on current medication regimen.  Last A1c reviewed- No results found for: LABA1C, HGBA1C  Most recent fingerstick glucose reviewed- No results for input(s): POCTGLUCOSE in the last 24 hours.  Current correctional scale  Low    Antihyperglycemics (From admission, onward)    Start     Stop Route Frequency Ordered    01/01/23 2345  insulin detemir U-100 injection 7 Units         -- SubQ Nightly 01/01/23 2242    12/31/22 1824  insulin aspart U-100 injection 0-5 Units         -- SubQ Before meals & nightly PRN 12/31/22 1737        Hold Oral hypoglycemics while patient is in the hospital.    01/08--resume home insulin @ 10 units HS, resume home monitoring, follow up with [facility]

## 2023-01-08 NOTE — DISCHARGE INSTRUCTIONS
Monitor your glucose closely and maintain a log, follow up with your primary care provider to discuss readings.

## 2023-01-08 NOTE — HOSPITAL COURSE
"01/07--"01/07/2023   PATIENT SEEN AND EVALUATED TODAY.  PATIENT IS IN NO ACUTE DISTRESS.  PATIENT APPARENTLY WAS BEING DIALYZED in [place] BUT HE WAS RELEASED FROM THE DIALYSIS CENTER.  PATIENT HAD MISSED DIALYSIS NOW HE IS BACK IN THE HOSPITAL RECEIVING HEMODIALYSIS.  WILL WAITING A PLACE FOR HIM TO DIALYZE PARTICULARLY A NEW PLACE.  TODAY'S AWAKE ALERT HE IS IN NO ACUTE DISTRESS HE DOES COMPLAIN OF DYSPNEA ON EXERTION LUNGS CRACKLES AT HIS BASES CARDIOVASCULAR S1-S2 REGULAR RATE RHYTHM ABDOMEN IS SOFT POSITIVE BOWEL SOUNDS NONTENDER EXTREMITIES TRACE EDEMA NEURO NONFOCAL   PATIENT PRESENTS WITH END-STAGE RENAL DISEASE, HE MISSED DIALYSIS X2 AND HE ALSO PRESENTED WITH DYSPNEA.  WILL CONTINUE DIALYSIS FOR NOW AND DISCHARGE WHEN THERE IS A DIALYSIS CHAIR AVAILABLE.  Continue inpatient hemodialysis for now"      01/08--Pt with no new issues or concerns, no sob, no cp.  States that current hospital meds are the same as his home meds and he has those meds at home.  [facility] [place] and pt states he is to resume his Tue, Thur, Sat schedule.  Encouraged adherence to HD regimen.  [facility] and report to HD as scheduled.  Pt is stable for discharge.     Patient counseled on the importance of keeping all hospital follow up appointments and compliance with medications, therapies, or devices as prescribed in order to provide for the best health outcomes.   Additionally, patient given written literature regarding their current disease processes and home care recommendations.   Patient given opportunity to ask questions and verbalized understanding of all information discussed.     "

## 2023-01-08 NOTE — ASSESSMENT & PLAN NOTE
Currently without an outpatient chair. Has missed 2 sessions, Bun/cr only 48/6.6.  Will have to work on this after the holiday.  No indication for urgent HD tonight. Will use nitropaste to relieve any pulmonary congestion.  Give a dose of lokelma,  K 4.7. Still producing some urine will give lasix 40 bid and place on fluid restriction. Consult nephrology tomorrow.     1/2 - Getting HD.  Home when outpatient chair arranged.     01/08--[facility], pt to resume HD in [place]

## 2023-01-08 NOTE — DISCHARGE SUMMARY
"[facility]  [place] [facility] Medicine  Discharge Summary      Patient Name: Leanna Harrison  MRN: [id number]  KAREN: [id number]  Patient Class: IP- Inpatient  Admission Date: 12/31/2022  Hospital Length of Stay: 8 days  Discharge Date and Time:  01/08/2023 3:12 PM  Attending Physician: Eleazar Watters MD   Discharging Provider: EVA Stewart  Primary Care Provider: Primary Doctor No    Primary Care Team: Networked reference to record PCT     HPI:   71 y/o WM with ESRD on HD for about 1 year.  Nephrologist Sven Bernal.  Has missed last 2 HD sessions as he has been dismissed as they found a firearm in his pocket.  Patient states he forgot to leave it in his car.  Regardless patient with some mild SOB today. Currently feels fine and would like to go home but has no where to get HD.   No CP but troponin elevated. We are asked to admit to arrange HD.       * No surgery found *      Hospital Course:   01/07--"01/07/2023   PATIENT SEEN AND EVALUATED TODAY.  PATIENT IS IN NO ACUTE DISTRESS.  PATIENT APPARENTLY WAS BEING DIALYZED in [place] BUT HE WAS RELEASED FROM THE DIALYSIS CENTER.  PATIENT HAD MISSED DIALYSIS NOW HE IS BACK IN THE HOSPITAL RECEIVING HEMODIALYSIS.  WILL WAITING A PLACE FOR HIM TO DIALYZE PARTICULARLY A NEW PLACE.  TODAY'S AWAKE ALERT HE IS IN NO ACUTE DISTRESS HE DOES COMPLAIN OF DYSPNEA ON EXERTION LUNGS CRACKLES AT HIS BASES CARDIOVASCULAR S1-S2 REGULAR RATE RHYTHM ABDOMEN IS SOFT POSITIVE BOWEL SOUNDS NONTENDER EXTREMITIES TRACE EDEMA NEURO NONFOCAL   PATIENT PRESENTS WITH END-STAGE RENAL DISEASE, HE MISSED DIALYSIS X2 AND HE ALSO PRESENTED WITH DYSPNEA.  WILL CONTINUE DIALYSIS FOR NOW AND DISCHARGE WHEN THERE IS A DIALYSIS CHAIR AVAILABLE.  Continue inpatient hemodialysis for now"      01/08--Pt with no new issues or concerns, no sob, no cp.  States that current hospital meds are the same as his home meds and he has those meds at home.  [facility] in [place] and pt states he is to resume his Tue, Thur, Sat schedule.  Encouraged adherence to HD regimen.  Pt report to HD as scheduled. Pt is fully dressed and states is ready to be discharged.  Glucose has been elevated, pt states is on 7 units at home, instructed to increase this to 10 units with close monitoring and bedtime snack, follow up with [facility] in one week.  Most recent glucose is 291, instructed nurse to treat with SS coverage as ordered prior to d/c. Pt is stable for discharge.     Patient counseled on the importance of keeping all hospital follow up appointments and compliance with medications, therapies, or devices as prescribed in order to provide for the best health outcomes.   Additionally, patient given written literature regarding their current disease processes and home care recommendations.   Patient given opportunity to ask questions and verbalized understanding of all information discussed.          Goals of Care Treatment Preferences:  Code Status: Full Code      Consults:   Consults (From admission, onward)          Status Ordering Provider     Inpatient consult to Social Work  Once        Provider:  (Not yet assigned)    Completed JOAQUÍN BURGER     Inpatient consult to Nephrology  Once        Provider:  Miguel Ángel Ureña Jr., MD    Acknowledged JOAQUÍN OATES JR     Inpatient consult to Nephrology  Once        Provider:  (Not yet assigned)    Acknowledged MONICA MYERS            * ESRD (end stage renal disease)    Currently without an outpatient chair. Has missed 2 sessions, Bun/cr only 48/6.6.  Will have to work on this after the holiday.  No indication for urgent HD tonight. Will use nitropaste to relieve any pulmonary congestion.  Give a dose of lokelma,  K 4.7. Still producing some urine will give lasix 40 bid and place on fluid restriction. Consult nephrology tomorrow.     1/2 - Getting HD.  Home when outpatient chair arranged.     01/08--[facility], [place] to [facility] [place] in  [place]     Diabetes mellitus  Patient's FSGs are uncontrolled due to hyperglycemia on current medication regimen.  Last A1c reviewed- No results found for: LABA1C, HGBA1C  Most recent fingerstick glucose reviewed- No results for input(s): POCTGLUCOSE in the last 24 hours.  Current correctional scale  Low    Antihyperglycemics (From admission, onward)      Start     Stop Route Frequency Ordered    01/01/23 2345  insulin detemir U-100 injection 7 Units         -- SubQ Nightly 01/01/23 2242    12/31/22 1824  insulin aspart U-100 injection 0-5 Units         -- SubQ Before meals & nightly PRN 12/31/22 1737          Hold Oral hypoglycemics while patient is in the hospital.    01/08--resume home insulin @ 10 units HS, resume home monitoring, follow up with [facility]    HTN (hypertension)        Anemia        Dyspnea    Likely related to fluid accumulation. See plan for ESRD.  Troponin elevated x 1. Will recheck. No Chest pain.     1/3 - Resolved after HD      Final Active Diagnoses:    Diagnosis Date Noted POA    PRINCIPAL PROBLEM:  ESRD (end stage renal disease) [N18.6] 12/31/2022 Yes    Anemia [D64.9] 01/01/2023 Yes    HTN (hypertension) [I10] 01/01/2023 Yes    Diabetes mellitus [E11.9] 01/01/2023 Yes    Dyspnea [R06.00] 12/31/2022 Yes      Problems Resolved During this Admission:       Discharged Condition: stable    Disposition: Home or Self Care    Follow Up:   Follow-up Information       [facility]  Follow up in 1 week(s).    Why: Follow up with the [facility] or your local [facility] in one week  Contact information:  [address] [facility] [phone]                         Patient Instructions:      Diet diabetic     Notify your health care provider if you experience any of the following:  temperature >100.4     Notify your health care provider if you experience any of the following:  persistent nausea and vomiting or diarrhea     Notify your health care provider if you  experience any of the following:  severe uncontrolled pain     Notify your health care provider if you experience any of the following:  redness, tenderness, or signs of infection (pain, swelling, redness, odor or green/yellow discharge around incision site)     Notify your health care provider if you experience any of the following:  difficulty breathing or increased cough     Notify your health care provider if you experience any of the following:  severe persistent headache     Notify your health care provider if you experience any of the following:  worsening rash     Notify your health care provider if you experience any of the following:  persistent dizziness, light-headedness, or visual disturbances     Notify your health care provider if you experience any of the following:  increased confusion or weakness     Activity as tolerated       Significant Diagnostic Studies: Labs:   BMP:   Recent Labs   Lab 01/07/23  0417 01/08/23  0408   * 317*    138   K 3.9 4.0    102   CO2 23 25   BUN 31* 51*   CREATININE 4.54* 6.14*   CALCIUM 9.3 9.2    and CBC   Recent Labs   Lab 01/07/23  0417 01/08/23  0409   WBC 11.72* 9.94   HGB 9.7* 9.5*   HCT 31.2* 31.0*    341       Pending Diagnostic Studies:       None           Medications:  Reconciled Home Medications:      Medication List        START taking these medications      amLODIPine 10 MG tablet  Commonly known as: NORVASC  Take 1 tablet (10 mg total) by mouth once daily.  Start taking on: January 9, 2023     aspirin 81 MG Chew  Take 1 tablet (81 mg total) by mouth once daily.  Start taking on: January 9, 2023     atorvastatin 80 MG tablet  Commonly known as: LIPITOR  Take 1 tablet (80 mg total) by mouth once daily.  Start taking on: January 9, 2023     carvediloL 12.5 MG tablet  Commonly known as: COREG  Take 1 tablet (12.5 mg total) by mouth 2 (two) times daily.     clopidogreL 75 mg tablet  Commonly known as: PLAVIX  Take 1 tablet (75 mg  total) by mouth once daily.  Start taking on: January 9, 2023     epoetin molina-epbx 10,000 unit/mL imjection  Commonly known as: RETACRIT  Continue as ordered per nephrology 10,000 units IV Every Mon, Wed, Fri     insulin detemir U-100 100 unit/mL injection  Commonly known as: Levemir  Inject 10 Units into the skin every evening.              Indwelling Lines/Drains at time of discharge:   Lines/Drains/Airways       Central Venous Catheter Line  Duration                  Hemodialysis AV Graft Left forearm -- days                    Time spent on the discharge of patient: >30 minutes         EVA Stewart  Department of Hospital Medicine  [facility]

## 2023-01-08 NOTE — PROGRESS NOTES
[facility]  Nephrology  Progress Note    Patient Name: Leanna Harrison  MRN: [id number]  Admission Date: 12/31/2022  Hospital Length of Stay: 8 days  Attending Provider: No att. providers found   Primary Care Physician: Primary Doctor No  Principal Problem:ESRD (end stage renal disease)    Consults  Subjective:     Interval History: The patient has no complaints today    Review of patient's allergies indicates:  No Known Allergies  Current Facility-Administered Medications   Medication Frequency    0.9%  NaCl infusion PRN    amLODIPine tablet 10 mg Daily    aspirin chewable tablet 81 mg Daily    atorvastatin tablet 80 mg Daily    clopidogreL tablet 75 mg Daily    dextrose 10% bolus 125 mL 125 mL PRN    dextrose 10% bolus 250 mL 250 mL PRN    epoetin molina-epbx injection 10,000 Units Every Mon, Wed, Fri    glucagon (human recombinant) injection 1 mg PRN    heparin (porcine) injection 5,000 Units Q8H    insulin aspart U-100 injection 0-5 Units QID (AC + HS) PRN    insulin detemir U-100 injection 7 Units QHS    naloxone 0.4 mg/mL injection 0.02 mg PRN    nitroGLYCERIN 2% TD oint ointment 0.5 inch Q6H    sodium chloride 0.9% flush 10 mL Q12H PRN       Objective:     Vital Signs (Most Recent):  Temp: 98.1 °F (36.7 °C) (01/08/23 1115)  Pulse: 67 (01/08/23 1115)  Resp: 14 (01/08/23 1115)  BP: (!) 155/71 (01/08/23 1115)  SpO2: 97 % (01/08/23 1115)   Vital Signs (24h Range):  Temp:  [97.6 °F (36.4 °C)-98.2 °F (36.8 °C)] 98.1 °F (36.7 °C)  Pulse:  [] 67  Resp:  [14-20] 14  SpO2:  [95 %-99 %] 97 %  BP: (148-163)/(65-76) 155/71     Weight: 90.7 kg (199 lb 15.3 oz) (01/07/23 1336)  Body mass index is 27.12 kg/m².  Body surface area is 2.15 meters squared.    No intake/output data recorded.    Physical Exam  Lungs-clear  Cor-2/6 systolic murmur  Abd-soft,nontender      Significant Labs:sureCB:   Recent Labs   Lab 01/08/23  0409   WBC 9.94   RBC 3.47*   HGB 9.5*   HCT 31.0*      MCV 89.3   MCH 27.4    MCHC 30.6*     CMP:   Recent Labs   Lab 01/08/23  0408   *   CALCIUM 9.2   ALBUMIN 2.8*      K 4.0   CO2 25      BUN 51*   CREATININE 6.14*     All labs within the past 24 hours have been reviewed.    Significant Imaging:      Assessment/Plan:     Active Diagnoses:    Diagnosis Date Noted POA    PRINCIPAL PROBLEM:  ESRD (end stage renal disease) [N18.6] 12/31/2022 Yes    Anemia [D64.9] 01/01/2023 Yes    HTN (hypertension) [I10] 01/01/2023 Yes    Diabetes mellitus [E11.9] 01/01/2023 Yes    Dyspnea [R06.00] 12/31/2022 Yes      Problems Resolved During this Admission:       Plan:  Continue hemodialysis as scheduled    Thank you for your consult. I will follow-up with patient. Please contact us if you have any additional questions.    Josemanuel Love MD  Nephrology  [facility]

## 2024-07-18 ENCOUNTER — HOSPITAL ENCOUNTER (EMERGENCY)
Facility: HOSPITAL | Age: 74
Discharge: HOME | End: 2024-07-18
Payer: OTHER GOVERNMENT

## 2024-07-18 VITALS
TEMPERATURE: 98 F | RESPIRATION RATE: 16 BRPM | HEIGHT: 72 IN | DIASTOLIC BLOOD PRESSURE: 58 MMHG | WEIGHT: 200 LBS | BODY MASS INDEX: 27.09 KG/M2 | HEART RATE: 62 BPM | SYSTOLIC BLOOD PRESSURE: 120 MMHG | OXYGEN SATURATION: 94 %

## 2024-07-18 DIAGNOSIS — R55 SYNCOPE: ICD-10-CM

## 2024-07-18 DIAGNOSIS — R55 SYNCOPE, UNSPECIFIED SYNCOPE TYPE: Primary | ICD-10-CM

## 2024-07-18 LAB
ALBUMIN SERPL BCP-MCNC: 2.6 G/DL (ref 3.5–5)
ALBUMIN/GLOB SERPL: 0.8 {RATIO}
ALP SERPL-CCNC: 181 U/L (ref 45–115)
ALT SERPL W P-5'-P-CCNC: 20 U/L (ref 16–61)
ANION GAP SERPL CALCULATED.3IONS-SCNC: 11 MMOL/L (ref 7–16)
AST SERPL W P-5'-P-CCNC: 13 U/L (ref 15–37)
BASOPHILS # BLD AUTO: 0.03 K/UL (ref 0–0.2)
BASOPHILS NFR BLD AUTO: 0.4 % (ref 0–1)
BILIRUB SERPL-MCNC: 0.3 MG/DL (ref ?–1.2)
BUN SERPL-MCNC: 27 MG/DL (ref 7–18)
BUN/CREAT SERPL: 5 (ref 6–20)
CALCIUM SERPL-MCNC: 8.7 MG/DL (ref 8.5–10.1)
CHLORIDE SERPL-SCNC: 99 MMOL/L (ref 98–107)
CO2 SERPL-SCNC: 32 MMOL/L (ref 21–32)
CREAT SERPL-MCNC: 5.15 MG/DL (ref 0.7–1.3)
DIFFERENTIAL METHOD BLD: ABNORMAL
EGFR (NO RACE VARIABLE) (RUSH/TITUS): 11 ML/MIN/1.73M2
EOSINOPHIL # BLD AUTO: 0.36 K/UL (ref 0–0.5)
EOSINOPHIL NFR BLD AUTO: 5.1 % (ref 1–4)
ERYTHROCYTE [DISTWIDTH] IN BLOOD BY AUTOMATED COUNT: 14.6 % (ref 11.5–14.5)
GLOBULIN SER-MCNC: 3.4 G/DL (ref 2–4)
GLUCOSE SERPL-MCNC: 195 MG/DL (ref 70–105)
GLUCOSE SERPL-MCNC: 196 MG/DL (ref 74–106)
HCT VFR BLD AUTO: 29.8 % (ref 40–54)
HGB BLD-MCNC: 9.2 G/DL (ref 13.5–18)
LYMPHOCYTES # BLD AUTO: 0.77 K/UL (ref 1–4.8)
LYMPHOCYTES NFR BLD AUTO: 11 % (ref 27–41)
MAGNESIUM SERPL-MCNC: 2 MG/DL (ref 1.7–2.3)
MCH RBC QN AUTO: 27.6 PG (ref 27–31)
MCHC RBC AUTO-ENTMCNC: 30.9 G/DL (ref 32–36)
MCV RBC AUTO: 89.5 FL (ref 80–96)
MONOCYTES # BLD AUTO: 0.74 K/UL (ref 0–0.8)
MONOCYTES NFR BLD AUTO: 10.5 % (ref 2–6)
MPC BLD CALC-MCNC: 12.4 FL (ref 9.4–12.4)
NEUTROPHILS # BLD AUTO: 5.13 K/UL (ref 1.8–7.7)
NEUTROPHILS NFR BLD AUTO: 73 % (ref 53–65)
OHS QRS DURATION: 120 MS
OHS QTC CALCULATION: 467 MS
PLATELET # BLD AUTO: 349 K/UL (ref 150–400)
POTASSIUM SERPL-SCNC: 3.9 MMOL/L (ref 3.5–5.1)
PROT SERPL-MCNC: 6 G/DL (ref 6.4–8.2)
RBC # BLD AUTO: 3.33 M/UL (ref 4.6–6.2)
SODIUM SERPL-SCNC: 138 MMOL/L (ref 136–145)
TROPONIN I SERPL DL<=0.01 NG/ML-MCNC: 348.3 PG/ML
WBC # BLD AUTO: 7.03 K/UL (ref 4.5–11)

## 2024-07-18 PROCEDURE — 93005 ELECTROCARDIOGRAM TRACING: CPT

## 2024-07-18 PROCEDURE — 99285 EMERGENCY DEPT VISIT HI MDM: CPT | Mod: 25

## 2024-07-18 PROCEDURE — 99285 EMERGENCY DEPT VISIT HI MDM: CPT | Mod: ,,,

## 2024-07-18 PROCEDURE — 82962 GLUCOSE BLOOD TEST: CPT

## 2024-07-18 PROCEDURE — 84484 ASSAY OF TROPONIN QUANT: CPT | Performed by: NURSE PRACTITIONER

## 2024-07-18 PROCEDURE — 83735 ASSAY OF MAGNESIUM: CPT | Performed by: NURSE PRACTITIONER

## 2024-07-18 PROCEDURE — 80053 COMPREHEN METABOLIC PANEL: CPT | Performed by: NURSE PRACTITIONER

## 2024-07-18 PROCEDURE — 85025 COMPLETE CBC W/AUTO DIFF WBC: CPT | Performed by: NURSE PRACTITIONER

## 2024-07-18 RX ORDER — FAMOTIDINE 20 MG/1
20 TABLET, FILM COATED ORAL 2 TIMES DAILY
COMMUNITY

## 2024-07-18 RX ORDER — SEVELAMER CARBONATE 800 MG/1
800 TABLET, FILM COATED ORAL
COMMUNITY

## 2024-07-18 RX ORDER — NIFEDIPINE 90 MG/1
30 TABLET, FILM COATED, EXTENDED RELEASE ORAL DAILY
COMMUNITY

## 2024-07-18 RX ORDER — CARVEDILOL 25 MG/1
25 TABLET ORAL 2 TIMES DAILY WITH MEALS
COMMUNITY

## 2024-07-18 RX ORDER — MONTELUKAST SODIUM 10 MG/1
10 TABLET ORAL NIGHTLY
COMMUNITY

## 2024-07-18 NOTE — ED TRIAGE NOTES
Patient presents to the ED via EMS from dialysis clinic in [place]. EMS reports that patient was unresponsive and pale after receiving dialysis. Upon arrival patient is alert and talking. He does not report any pain or shortness of breathe at this time.

## 2024-07-18 NOTE — ED PROVIDER NOTES
Encounter Date: 7/18/2024       History     Chief Complaint   Patient presents with    Loss of Consciousness     Leanna Harrison is a 73 y.o. White /male presenting to ED via EMS after syncopal episode while leaving dialysis today. His brother (primary caregiver) is present at bedside and notes that he was wheeling him out of dialysis (2519 cc removed) and he went unresponsive so he rolled him back into dialysis and EMS was called. He did not fall from  so there are no secondary injuries associated with syncopal episode. Brother notes that he was out approx 10 min before he came back around and is now back to baseline. Brother notes that he has done this in the past but unable to give any other details. Brother notes that [facility] for weakness. He is currently wearing a heart monitor which he is supposed to wear for 2 weeks and then [facility]. Brother unable to give details on reason for heart monitor application. He dialyzes on T/TH/S. Hx of CVA, CKD, HTN, DM. Glucose 195 on arrival. Currently Awake and alert in NAD. Speech garbled, right arm weakness and left leg weakness residual from previous CVA. Patient reports that he feels pretty good at this time. He has no complaints. VSS at this time.      The history is provided by the patient and a caregiver.     Review of patient's allergies indicates:   Allergen Reactions    Codeine      Past Medical History:   Diagnosis Date    Diabetes mellitus     Hypertension     Renal failure     Stroke      History reviewed. No pertinent surgical history.  No family history on file.  Social History     Tobacco Use    Smoking status: Never    Smokeless tobacco: Former     Types: Snuff   Substance Use Topics    Alcohol use: Not Currently     Alcohol/week: 1.0 standard drink of alcohol     Types: 1 Cans of beer per week    Drug use: Never     Review of Systems   Reason unable to perform ROS: ROS obtained  primarily from brother. limited due to patient poor historian and unintelligable speech which is noted to be baseline for patient.   Constitutional:  Positive for activity change. Negative for chills, diaphoresis, fatigue and fever.   HENT:  Negative for congestion, rhinorrhea and sore throat.    Eyes:  Negative for visual disturbance.   Respiratory:  Negative for cough and shortness of breath.    Cardiovascular:  Negative for chest pain and palpitations.   Gastrointestinal:  Negative for abdominal pain, constipation, diarrhea and vomiting.   Genitourinary:  Negative for dysuria.   Musculoskeletal:  Positive for gait problem (prior CVA).   Skin:  Negative for wound.   Neurological:  Positive for syncope. Negative for dizziness, speech difficulty and light-headedness.   Psychiatric/Behavioral:  Negative for confusion. The patient is not nervous/anxious.        Physical Exam     Initial Vitals [07/18/24 1716]   BP Pulse Resp Temp SpO2   (!) 120/58 62 16 97.6 °F (36.4 °C) (!) 94 %      MAP       --         Physical Exam    Nursing note and vitals reviewed.  Constitutional: He appears well-developed and well-nourished. He is not diaphoretic. No distress.   HENT:   Head: Normocephalic and atraumatic.   Right Ear: Hearing, tympanic membrane, external ear and ear canal normal.   Left Ear: Hearing, tympanic membrane, external ear and ear canal normal.   Nose: Nose normal.   Mouth/Throat: Uvula is midline and oropharynx is clear and moist. Mucous membranes are dry.   Eyes: Conjunctivae and EOM are normal. Pupils are equal, round, and reactive to light. No scleral icterus.   Neck: Neck supple.   Normal range of motion.  Cardiovascular:  Normal rate, regular rhythm, normal heart sounds and intact distal pulses.           Pulmonary/Chest: Breath sounds normal. No respiratory distress.   Abdominal: Abdomen is soft. Bowel sounds are normal. He exhibits no distension. There is no abdominal tenderness. There is no rebound and no  guarding.   Musculoskeletal:      Cervical back: Normal range of motion and neck supple.     Neurological: He is alert. GCS score is 15. GCS eye subscore is 4. GCS verbal subscore is 5. GCS motor subscore is 6.   Right arm weakness, left leg weakness residual from previous CVA   Skin: Skin is warm and dry. Capillary refill takes less than 2 seconds.         Medical Screening Exam   See Full Note    ED Course   Procedures  Labs Reviewed   COMPREHENSIVE METABOLIC PANEL - Abnormal; Notable for the following components:       Result Value    Glucose 196 (*)     BUN 27 (*)     Creatinine 5.15 (*)     BUN/Creatinine Ratio 5 (*)     Total Protein 6.0 (*)     Albumin 2.6 (*)     Alk Phos 181 (*)     AST 13 (*)     eGFR 11 (*)     All other components within normal limits   TROPONIN I - Abnormal; Notable for the following components:    Troponin I High Sensitivity 348.3 (*)     All other components within normal limits   CBC WITH DIFFERENTIAL - Abnormal; Notable for the following components:    RBC 3.33 (*)     Hemoglobin 9.2 (*)     Hematocrit 29.8 (*)     MCHC 30.9 (*)     RDW 14.6 (*)     Neutrophils % 73.0 (*)     Lymphocytes % 11.0 (*)     Lymphocytes, Absolute 0.77 (*)     Monocytes % 10.5 (*)     Eosinophils % 5.1 (*)     All other components within normal limits   POCT GLUCOSE MONITORING CONTINUOUS - Abnormal; Notable for the following components:    POC Glucose 195 (*)     All other components within normal limits   MAGNESIUM - Normal   CBC W/ AUTO DIFFERENTIAL    Narrative:     The following orders were created for panel order CBC auto differential.  Procedure                               Abnormality         Status                     ---------                               -----------         ------                     CBC with Differential[[id number]]       Abnormal            Final result                 Please view results for these tests on the individual orders.   URINALYSIS, REFLEX TO URINE CULTURE   POCT  GLUCOSE MONITORING CONTINUOUS     EKG Readings: (Independently Interpreted)   Heart Rate: 61.   Wide QRS rhythm, LVH     ECG Results              EKG 12-lead (In process)        Collection Time Result Time QRS Duration [facility] QTC Calculation    07/18/24 17:26:01 07/18/24 17:35:36 120 467                     In process by Interface, Lab In [facility] (07/18/24 17:35:40)                   Narrative:    Test Reason : R55,    Vent. Rate : 061 BPM     Atrial Rate : 060 BPM     P-R Int : 000 ms          QRS Dur : 120 ms      QT Int : 464 ms       P-R-T Axes : 000 000 -86 degrees     QTc Int : 467 ms    Wide QRS rhythm  LVH with QRS widening and repolarization abnormality  Abnormal ECG  When compared with ECG of 31-DEC-2022 12:52,  PREVIOUS ECG IS PRESENT    Referred By:             Confirmed By:                                   Imaging Results              X-Ray Chest AP Portable (Final result)  Result time 07/18/24 18:47:42      Final result by Nelson Rodríguez II, MD (07/18/24 18:47:42)                   Impression:      Findings suggest mild cardiac decompensation and / or pneumonitis.      Electronically signed by: Nelson Rodríguez  Date:    07/18/2024  Time:    18:47               Narrative:    EXAMINATION:  XR CHEST AP PORTABLE    CLINICAL HISTORY:  Syncope and collapse    COMPARISON:  31 December 2022    TECHNIQUE:  XR CHEST AP PORTABLE    FINDINGS:  The heart and mediastinum are stable in size and configuration.  The pulmonary vascularity is slightly increased with bilateral increased interstitial lung density.  No other lung infiltrates, effusions, pneumothorax or other abnormality is demonstrated.                                       CT Head Without Contrast (Final result)  Result time 07/18/24 18:47:06      Final result by Nelson Rodríguez II, MD (07/18/24 18:47:06)                   Impression:      No evidence of acute process or interval change.      Electronically signed by: Nelson  Marcos  Date:    07/18/2024  Time:    18:47               Narrative:    EXAMINATION:  CT HEAD WITHOUT CONTRAST    CLINICAL HISTORY:  Syncope, recurrent;    TECHNIQUE:  Axial CT imaging of the brain is performed without contrast with 3 mm increments.    CT dose reduction technique used - Dose Rite and tube current modulation.    COMPARISON:  None available    FINDINGS:  No evidence of hemorrhage,  mass,  mass effect,  midline shift or acute infarct seen.  There is moderate diffuse cerebral atrophy.  There are areas of decreased density seen within the white matter.  Otherwise the brain parenchyma attenuation and differentiation appears within normal limits. The ventricles and cisterns are normal in caliber.  No cranial or skull base abnormality is identified.                                    X-Rays:   Independently Interpreted Readings:   Other Readings:  Reading Physician Reading Date Result Priority  Nelson Rodríguez II, MD  [phone] 7/18/2024 STAT    Narrative & Impression  EXAMINATION:  XR CHEST AP PORTABLE     CLINICAL HISTORY:  Syncope and collapse     COMPARISON:  31 December 2022     TECHNIQUE:  XR CHEST AP PORTABLE     FINDINGS:  The heart and mediastinum are stable in size and configuration.  The pulmonary vascularity is slightly increased with bilateral increased interstitial lung density.  No other lung infiltrates, effusions, pneumothorax or other abnormality is demonstrated.     Impression:     Findings suggest mild cardiac decompensation and / or pneumonitis.        Electronically signed by:Nelson Rodríguez  Date:                                            07/18/2024  Time:                                           18:47  Reading Physician Reading Date Result Priority  Nelson Rodríguez II, MD  [phone] 7/18/2024 STAT    Narrative & Impression  EXAMINATION:  CT HEAD WITHOUT CONTRAST     CLINICAL HISTORY:  Syncope, recurrent;     TECHNIQUE:  Axial CT imaging of the brain is performed  without contrast with 3 mm increments.     CT dose reduction technique used - Dose Rite and tube current modulation.     COMPARISON:  None available     FINDINGS:  No evidence of hemorrhage,  mass,  mass effect,  midline shift or acute infarct seen.  There is moderate diffuse cerebral atrophy.  There are areas of decreased density seen within the white matter.  Otherwise the brain parenchyma attenuation and differentiation appears within normal limits. The ventricles and cisterns are normal in caliber.  No cranial or skull base abnormality is identified.     Impression:     No evidence of acute process or interval change.        Electronically signed by:Nelson Rodríguez  Date:                                            07/18/2024  Time:                                           18:47        Exam Ended: 07/18/24 18:26 CDT Last Resulted: 07/18/24 18:47 CDT      Order Details        View Encounter        Lab and Collection Details        Routing        Result History    View All Conversations on this Encounter        Result Care Coordination            Medications - No data to display  Medical Decision Making  Leanna Harrison is a 73 y.o. White /male presenting to ED via EMS after syncopal episode while leaving dialysis today. His brother (primary caregiver) is present at bedside and notes that he was wheeling him out of dialysis (2519 cc removed) and he went unresponsive so he rolled him back into dialysis and EMS was called. He did not fall from WC so there are no secondary injuries associated with syncopal episode. Brother notes that he was out approx 10 min before he came back around and is now back to baseline. Brother notes that he has done this in the past but unable to give any other details. Brother notes that [facility] approx [facility] weeks for weakness. He is currently wearing a heart monitor which he is supposed to wear for 2 weeks and then return [facility]. Brother  unable to give details on reason for heart monitor application. He dialyzes on T/TH/S. Hx of CVA, CKD, HTN, DM. Glucose 195 on arrival. Currently Awake and alert in NAD. Speech garbled, right arm weakness and left leg weakness residual from previous CVA. Patient reports that he feels pretty good at this time. He has no complaints. VSS at this time.      The history is provided by the patient and a caregiver.       Amount and/or Complexity of Data Reviewed  Independent Historian:      Details: Leanna Harrison is a 73 y.o. White /male presenting to ED via EMS after syncopal episode while leaving dialysis today. His brother (primary caregiver) is present at bedside and notes that he was wheeling him out of dialysis (2519 cc removed) and he went unresponsive so he rolled him back into dialysis and EMS was called. He did not fall from WC so there are no secondary injuries associated with syncopal episode. Brother notes that he was out approx 10 min before he came back around and is now back to baseline. Brother notes that he has done this in the past but unable to give any other details. Brother notes that [facility] just [facility] for approx 2 weeks for weakness. He is currently wearing a heart monitor which he is supposed to wear for 2 weeks and then return [facility]. Brother unable to give details on reason for heart monitor application. He dialyzes on T/TH/S. Hx of CVA, CKD, HTN, DM. Glucose 195 on arrival. Currently Awake and alert in NAD. Speech garbled, right arm weakness and left leg weakness residual from previous CVA. Patient reports that he feels pretty good at this time. He has no complaints. VSS at this time.    Labs: ordered. Decision-making details documented in ED Course.  Radiology: ordered.               ED Course as of 07/18/24 1901   Thu Jul 18, 2024   1857 Lab and CT results reviewed by me and discussed with patient.  [AC]   1858 Troponin I High Sensitivity(!!):  348.3  Chronically elevated Troponin due to ESRD, no change from previous valvue on 1/08/2023 [AC]   1858 Hemoglobin(!): 9.2  No change from previous lab work performed on 01/08/2023.   [AC]   1858 Hematocrit(!): 29.8  No change from previous lab work performed on 01/08/2023. [AC]   1858 Creatinine(!): 5.15  Patient is a dialysis patient, ERSD.  [AC]   1858 BUN(!): 27  Patient is a dialysis patient, ERSD.  [AC]      ED Course User Index  [AC] Yanick Godinez FNP                           Clinical Impression:   Final diagnoses:  [R55] Syncope  [R55] Syncope, unspecified syncope type (Primary)        ED Disposition Condition    Discharge Stable          ED Prescriptions    None       Follow-up Information       Follow up With Specialties Details Why Contact Info    local pcp   As needed, If symptoms worsen              Yanick Godinez FNP  07/18/24 [id number]

## 2024-07-19 NOTE — DISCHARGE INSTRUCTIONS
- Follow up with pcp as needed if symptoms continue  - Drink plenty of fluids  - The examination and treatment you have received in the Emergency Department today have been rendered on an emergency basis only and are not intended to be a substitute for an effort to provide complete medical care. You should contact your follow-up physician as it is important that you let him or her check you and report any new or remaining problems since it is impossible to recognize and treat all elements of an injury or illness in a single emergency care center visit.